# Patient Record
Sex: FEMALE | Race: WHITE | NOT HISPANIC OR LATINO | Employment: OTHER | URBAN - METROPOLITAN AREA
[De-identification: names, ages, dates, MRNs, and addresses within clinical notes are randomized per-mention and may not be internally consistent; named-entity substitution may affect disease eponyms.]

---

## 2023-09-28 LAB
ALANINE AMINOTRANSFERASE (SGPT) (U/L) IN SER/PLAS: 19 U/L (ref 7–45)
ALBUMIN (G/DL) IN SER/PLAS: 3.4 G/DL (ref 3.4–5)
ALKALINE PHOSPHATASE (U/L) IN SER/PLAS: 66 U/L (ref 33–136)
ANION GAP IN SER/PLAS: 11 MMOL/L (ref 10–20)
ASPARTATE AMINOTRANSFERASE (SGOT) (U/L) IN SER/PLAS: 22 U/L (ref 9–39)
BASOPHILS (10*3/UL) IN BLOOD BY AUTOMATED COUNT: 0.02 X10E9/L (ref 0–0.1)
BASOPHILS/100 LEUKOCYTES IN BLOOD BY AUTOMATED COUNT: 0.3 % (ref 0–2)
BILIRUBIN TOTAL (MG/DL) IN SER/PLAS: 0.6 MG/DL (ref 0–1.2)
CALCIUM (MG/DL) IN SER/PLAS: 8.2 MG/DL (ref 8.6–10.3)
CARBON DIOXIDE, TOTAL (MMOL/L) IN SER/PLAS: 31 MMOL/L (ref 21–32)
CHLORIDE (MMOL/L) IN SER/PLAS: 96 MMOL/L (ref 98–107)
CREATININE (MG/DL) IN SER/PLAS: 0.85 MG/DL (ref 0.5–1.05)
ERYTHROCYTE DISTRIBUTION WIDTH (RATIO) BY AUTOMATED COUNT: 12.6 % (ref 11.5–14.5)
ERYTHROCYTE MEAN CORPUSCULAR HEMOGLOBIN CONCENTRATION (G/DL) BY AUTOMATED: 32.9 G/DL (ref 32–36)
ERYTHROCYTE MEAN CORPUSCULAR VOLUME (FL) BY AUTOMATED COUNT: 102 FL (ref 80–100)
ERYTHROCYTES (10*6/UL) IN BLOOD BY AUTOMATED COUNT: 4.06 X10E12/L (ref 4–5.2)
FLU A RESULT: NORMAL
FLU A RESULT: NOT DETECTED
FLU B RESULT: NORMAL
FLU B RESULT: NOT DETECTED
GFR FEMALE: 67 ML/MIN/1.73M2
GLUCOSE (MG/DL) IN SER/PLAS: 117 MG/DL (ref 74–99)
HEMATOCRIT (%) IN BLOOD BY AUTOMATED COUNT: 41.4 % (ref 36–46)
HEMOGLOBIN (G/DL) IN BLOOD: 13.6 G/DL (ref 12–16)
IMMATURE GRANULOCYTES/100 LEUKOCYTES IN BLOOD BY AUTOMATED COUNT: 0.3 % (ref 0–0.9)
INR IN PPP BY COAGULATION ASSAY: 3.9 (ref 0.9–1.1)
LACTATE (MMOL/L) IN SER/PLAS: 0.7 MMOL/L (ref 0.4–2)
LEUKOCYTES (10*3/UL) IN BLOOD BY AUTOMATED COUNT: 7.7 X10E9/L (ref 4.4–11.3)
LYMPHOCYTES (10*3/UL) IN BLOOD BY AUTOMATED COUNT: 0.7 X10E9/L (ref 0.8–3)
LYMPHOCYTES/100 LEUKOCYTES IN BLOOD BY AUTOMATED COUNT: 9.2 % (ref 13–44)
MONOCYTES (10*3/UL) IN BLOOD BY AUTOMATED COUNT: 0.85 X10E9/L (ref 0.05–0.8)
MONOCYTES/100 LEUKOCYTES IN BLOOD BY AUTOMATED COUNT: 11.1 % (ref 2–10)
NATRIURETIC PEPTIDE B (PG/ML) IN SER/PLAS: 403 PG/ML (ref 0–99)
NEUTROPHILS (10*3/UL) IN BLOOD BY AUTOMATED COUNT: 6.06 X10E9/L (ref 1.6–5.5)
NEUTROPHILS/100 LEUKOCYTES IN BLOOD BY AUTOMATED COUNT: 79.1 % (ref 40–80)
PATIENT TEMPERATURE: 37 DEGREES C
PLATELETS (10*3/UL) IN BLOOD AUTOMATED COUNT: 240 X10E9/L (ref 150–450)
POCT ANION GAP, VENOUS: 2 MMOL/L (ref 10–25)
POCT BASE EXCESS, VENOUS: 7.4 MMOL/L (ref -2–3)
POCT CHLORIDE, VENOUS: 95 MMOL/L (ref 98–107)
POCT GLUCOSE, VENOUS: 123 MG/DL (ref 74–99)
POCT HCO3, VENOUS: 33.3 MMOL/L (ref 22–26)
POCT HEMATOCRIT, VENOUS: 54 % (ref 36–46)
POCT HEMOGLOBIN, VENOUS: 17.9 G/DL (ref 12–16)
POCT IONIZED CALCIUM, VENOUS: 1.07 MMOL/L (ref 1.1–1.33)
POCT LACTATE, VENOUS: 1 MMOL/L (ref 0.4–2)
POCT OXY HEMOGLOBIN, VENOUS: 39.8 % (ref 45–75)
POCT PCO2, VENOUS: 49 MMHG (ref 41–51)
POCT PH, VENOUS: 7.44 (ref 7.33–7.43)
POCT PO2, VENOUS: 23 MMHG (ref 35–45)
POCT POTASSIUM, VENOUS: 3.5 MMOL/L (ref 3.5–5.3)
POCT SO2, VENOUS: 40 % (ref 45–75)
POCT SODIUM, VENOUS: 127 MMOL/L (ref 136–145)
POTASSIUM (MMOL/L) IN SER/PLAS: 3.6 MMOL/L (ref 3.5–5.3)
PROTEIN TOTAL: 6.2 G/DL (ref 6.4–8.2)
PROTHROMBIN TIME (PT) IN PPP BY COAGULATION ASSAY: 44.8 SEC (ref 9.8–12.8)
SARS-COV-2 RESULT: DETECTED
SODIUM (MMOL/L) IN SER/PLAS: 134 MMOL/L (ref 136–145)
TROPONIN I, HIGH SENSITIVITY: 14 NG/L (ref 0–13)
TROPONIN I, HIGH SENSITIVITY: 15 NG/L (ref 0–13)
UREA NITROGEN (MG/DL) IN SER/PLAS: 15 MG/DL (ref 6–23)

## 2023-09-28 PROCEDURE — 71046 X-RAY EXAM CHEST 2 VIEWS: CPT

## 2023-09-28 PROCEDURE — 70450 CT HEAD/BRAIN W/O DYE: CPT

## 2023-09-28 PROCEDURE — 83605 ASSAY OF LACTIC ACID: CPT | Mod: 91

## 2023-09-28 PROCEDURE — 96367 TX/PROPH/DG ADDL SEQ IV INF: CPT

## 2023-09-28 PROCEDURE — 9990 CHARGE CONVERSION: Mod: 91

## 2023-09-28 PROCEDURE — 99291 CRITICAL CARE FIRST HOUR: CPT

## 2023-09-28 PROCEDURE — 82947 ASSAY GLUCOSE BLOOD QUANT: CPT | Mod: 91

## 2023-09-28 PROCEDURE — 87040 BLOOD CULTURE FOR BACTERIA: CPT | Mod: 59

## 2023-09-28 PROCEDURE — 83880 ASSAY OF NATRIURETIC PEPTIDE: CPT

## 2023-09-28 PROCEDURE — 94761 N-INVAS EAR/PLS OXIMETRY MLT: CPT

## 2023-09-28 PROCEDURE — 96365 THER/PROPH/DIAG IV INF INIT: CPT

## 2023-09-28 PROCEDURE — 84132 ASSAY OF SERUM POTASSIUM: CPT | Mod: 91

## 2023-09-28 PROCEDURE — 71045 X-RAY EXAM CHEST 1 VIEW: CPT

## 2023-09-28 PROCEDURE — 85379 FIBRIN DEGRADATION QUANT: CPT

## 2023-09-28 PROCEDURE — 85610 PROTHROMBIN TIME: CPT

## 2023-09-28 PROCEDURE — 82330 ASSAY OF CALCIUM: CPT

## 2023-09-28 PROCEDURE — 82805 BLOOD GASES W/O2 SATURATION: CPT

## 2023-09-28 PROCEDURE — 80053 COMPREHEN METABOLIC PANEL: CPT

## 2023-09-28 PROCEDURE — 85025 COMPLETE CBC W/AUTO DIFF WBC: CPT

## 2023-09-28 PROCEDURE — 93005 ELECTROCARDIOGRAM TRACING: CPT

## 2023-09-28 PROCEDURE — 85018 HEMOGLOBIN: CPT | Mod: 91

## 2023-09-28 PROCEDURE — 84484 ASSAY OF TROPONIN QUANT: CPT

## 2023-09-28 PROCEDURE — 87636 SARSCOV2 & INF A&B AMP PRB: CPT

## 2023-09-28 PROCEDURE — 82435 ASSAY OF BLOOD CHLORIDE: CPT | Mod: 91

## 2023-09-28 PROCEDURE — 84295 ASSAY OF SERUM SODIUM: CPT | Mod: 91

## 2023-09-29 ENCOUNTER — HOSPITAL ENCOUNTER (INPATIENT)
Dept: DATA CONVERSION | Facility: HOSPITAL | Age: 83
LOS: 1 days | Discharge: HOME | DRG: 871 | End: 2023-09-30
Attending: STUDENT IN AN ORGANIZED HEALTH CARE EDUCATION/TRAINING PROGRAM | Admitting: INTERNAL MEDICINE
Payer: MEDICARE

## 2023-09-29 DIAGNOSIS — I48.91 ATRIAL FIBRILLATION, UNSPECIFIED TYPE (MULTI): Primary | ICD-10-CM

## 2023-09-29 DIAGNOSIS — U07.1 COVID-19: ICD-10-CM

## 2023-09-29 LAB
ALBUMIN (G/DL) IN SER/PLAS: 3 G/DL (ref 3.4–5)
ANION GAP IN SER/PLAS: 11 MMOL/L (ref 10–20)
APPEARANCE, URINE: CLEAR
BASOPHILS (10*3/UL) IN BLOOD BY AUTOMATED COUNT: 0.03 X10E9/L (ref 0–0.1)
BASOPHILS/100 LEUKOCYTES IN BLOOD BY AUTOMATED COUNT: 0.5 % (ref 0–2)
BILIRUBIN, URINE: NEGATIVE
BLOOD, URINE: NEGATIVE
CALCIUM (MG/DL) IN SER/PLAS: 7.7 MG/DL (ref 8.6–10.3)
CARBON DIOXIDE, TOTAL (MMOL/L) IN SER/PLAS: 26 MMOL/L (ref 21–32)
CHLORIDE (MMOL/L) IN SER/PLAS: 101 MMOL/L (ref 98–107)
COLOR, URINE: YELLOW
CREATININE (MG/DL) IN SER/PLAS: 0.71 MG/DL (ref 0.5–1.05)
EOSINOPHILS (10*3/UL) IN BLOOD BY AUTOMATED COUNT: 0.02 X10E9/L (ref 0–0.4)
EOSINOPHILS/100 LEUKOCYTES IN BLOOD BY AUTOMATED COUNT: 0.3 % (ref 0–6)
ERYTHROCYTE DISTRIBUTION WIDTH (RATIO) BY AUTOMATED COUNT: 12.5 % (ref 11.5–14.5)
ERYTHROCYTE MEAN CORPUSCULAR HEMOGLOBIN CONCENTRATION (G/DL) BY AUTOMATED: 31.5 G/DL (ref 32–36)
ERYTHROCYTE MEAN CORPUSCULAR VOLUME (FL) BY AUTOMATED COUNT: 103 FL (ref 80–100)
ERYTHROCYTES (10*6/UL) IN BLOOD BY AUTOMATED COUNT: 3.95 X10E12/L (ref 4–5.2)
FIBRIN D-DIMER (NG/ML FEU) IN PLATELET POOR PLASMA: 408 NG/ML FEU
GFR FEMALE: 83 ML/MIN/1.73M2
GLUCOSE (MG/DL) IN SER/PLAS: 84 MG/DL (ref 74–99)
GLUCOSE, URINE: NEGATIVE MG/DL
HEMATOCRIT (%) IN BLOOD BY AUTOMATED COUNT: 40.6 % (ref 36–46)
HEMOGLOBIN (G/DL) IN BLOOD: 12.8 G/DL (ref 12–16)
IMMATURE GRANULOCYTES/100 LEUKOCYTES IN BLOOD BY AUTOMATED COUNT: 0.5 % (ref 0–0.9)
INR IN PPP BY COAGULATION ASSAY: 3.4 (ref 0.9–1.1)
KETONES, URINE: NEGATIVE MG/DL
LEUKOCYTE ESTERASE, URINE: NEGATIVE
LEUKOCYTES (10*3/UL) IN BLOOD BY AUTOMATED COUNT: 6.4 X10E9/L (ref 4.4–11.3)
LYMPHOCYTES (10*3/UL) IN BLOOD BY AUTOMATED COUNT: 1.05 X10E9/L (ref 0.8–3)
LYMPHOCYTES/100 LEUKOCYTES IN BLOOD BY AUTOMATED COUNT: 16.4 % (ref 13–44)
MAGNESIUM (MG/DL) IN SER/PLAS: 1.83 MG/DL (ref 1.6–2.4)
MONOCYTES (10*3/UL) IN BLOOD BY AUTOMATED COUNT: 0.97 X10E9/L (ref 0.05–0.8)
MONOCYTES/100 LEUKOCYTES IN BLOOD BY AUTOMATED COUNT: 15.2 % (ref 2–10)
NEUTROPHILS (10*3/UL) IN BLOOD BY AUTOMATED COUNT: 4.3 X10E9/L (ref 1.6–5.5)
NEUTROPHILS/100 LEUKOCYTES IN BLOOD BY AUTOMATED COUNT: 67.1 % (ref 40–80)
NITRITE, URINE: NEGATIVE
PH, URINE: 6 (ref 5–8)
PHOSPHATE (MG/DL) IN SER/PLAS: 2.4 MG/DL (ref 2.5–4.9)
PLATELETS (10*3/UL) IN BLOOD AUTOMATED COUNT: 206 X10E9/L (ref 150–450)
POTASSIUM (MMOL/L) IN SER/PLAS: 3.4 MMOL/L (ref 3.5–5.3)
PROTEIN, URINE: NEGATIVE MG/DL
PROTHROMBIN TIME (PT) IN PPP BY COAGULATION ASSAY: 38.3 SEC (ref 9.8–12.8)
SODIUM (MMOL/L) IN SER/PLAS: 135 MMOL/L (ref 136–145)
SPECIFIC GRAVITY, URINE: 1.01 (ref 1–1.03)
TROPONIN I, HIGH SENSITIVITY: NORMAL
UREA NITROGEN (MG/DL) IN SER/PLAS: 12 MG/DL (ref 6–23)
UROBILINOGEN, URINE: <2 MG/DL (ref 0–1.9)

## 2023-09-29 PROCEDURE — 85025 COMPLETE CBC W/AUTO DIFF WBC: CPT

## 2023-09-29 PROCEDURE — 70450 CT HEAD/BRAIN W/O DYE: CPT

## 2023-09-29 PROCEDURE — 96366 THER/PROPH/DIAG IV INF ADDON: CPT

## 2023-09-29 PROCEDURE — 71045 X-RAY EXAM CHEST 1 VIEW: CPT

## 2023-09-29 PROCEDURE — 3E0D73Z INTRODUCTION OF ANTI-INFLAMMATORY INTO MOUTH AND PHARYNX, VIA NATURAL OR ARTIFICIAL OPENING: ICD-10-PCS | Performed by: INTERNAL MEDICINE

## 2023-09-29 PROCEDURE — 81003 URINALYSIS AUTO W/O SCOPE: CPT

## 2023-09-29 PROCEDURE — 96367 TX/PROPH/DG ADDL SEQ IV INF: CPT

## 2023-09-29 PROCEDURE — 71046 X-RAY EXAM CHEST 2 VIEWS: CPT

## 2023-09-29 PROCEDURE — 97165 OT EVAL LOW COMPLEX 30 MIN: CPT | Mod: GO

## 2023-09-29 PROCEDURE — 99291 CRITICAL CARE FIRST HOUR: CPT

## 2023-09-29 PROCEDURE — 96365 THER/PROPH/DIAG IV INF INIT: CPT

## 2023-09-29 PROCEDURE — 80069 RENAL FUNCTION PANEL: CPT

## 2023-09-29 PROCEDURE — 93005 ELECTROCARDIOGRAM TRACING: CPT

## 2023-09-29 PROCEDURE — 85610 PROTHROMBIN TIME: CPT

## 2023-09-29 PROCEDURE — 36415 COLL VENOUS BLD VENIPUNCTURE: CPT

## 2023-09-29 PROCEDURE — 97161 PT EVAL LOW COMPLEX 20 MIN: CPT | Mod: GP

## 2023-09-29 PROCEDURE — 9990 CHARGE CONVERSION: Mod: GO

## 2023-09-29 PROCEDURE — 83735 ASSAY OF MAGNESIUM: CPT

## 2023-09-29 PROCEDURE — 94761 N-INVAS EAR/PLS OXIMETRY MLT: CPT

## 2023-09-29 PROCEDURE — XW033E5 INTRODUCTION OF REMDESIVIR ANTI-INFECTIVE INTO PERIPHERAL VEIN, PERCUTANEOUS APPROACH, NEW TECHNOLOGY GROUP 5: ICD-10-PCS | Performed by: INTERNAL MEDICINE

## 2023-09-29 RX ORDER — SPIRONOLACTONE 25 MG/1
25 TABLET ORAL DAILY
Status: DISCONTINUED | OUTPATIENT
Start: 2023-09-30 | End: 2023-09-30

## 2023-09-29 RX ORDER — WARFARIN SODIUM 5 MG/1
5 TABLET ORAL SEE ADMIN INSTRUCTIONS
Status: ON HOLD | COMMUNITY
End: 2023-09-30 | Stop reason: SDUPTHER

## 2023-09-29 RX ORDER — TORSEMIDE 20 MG/1
20 TABLET ORAL 2 TIMES DAILY
COMMUNITY

## 2023-09-29 RX ORDER — DEXAMETHASONE 6 MG/1
6 TABLET ORAL DAILY
Status: DISCONTINUED | OUTPATIENT
Start: 2023-09-30 | End: 2023-09-29

## 2023-09-29 RX ORDER — ONDANSETRON HYDROCHLORIDE 2 MG/ML
4 INJECTION, SOLUTION INTRAVENOUS EVERY 4 HOURS PRN
Status: DISCONTINUED | OUTPATIENT
Start: 2023-09-30 | End: 2023-09-30 | Stop reason: HOSPADM

## 2023-09-29 RX ORDER — DIGOXIN 125 MCG
125 TABLET ORAL DAILY
Status: DISCONTINUED | OUTPATIENT
Start: 2023-09-30 | End: 2023-09-30 | Stop reason: HOSPADM

## 2023-09-29 RX ORDER — MIDODRINE HYDROCHLORIDE 2.5 MG/1
2.5 TABLET ORAL EVERY MORNING
COMMUNITY

## 2023-09-29 RX ORDER — SPIRONOLACTONE 25 MG/1
25 TABLET ORAL DAILY
Status: DISCONTINUED | OUTPATIENT
Start: 2023-09-30 | End: 2023-09-30 | Stop reason: HOSPADM

## 2023-09-29 RX ORDER — METOPROLOL TARTRATE 25 MG/1
25 TABLET, FILM COATED ORAL 3 TIMES DAILY
Status: DISCONTINUED | OUTPATIENT
Start: 2023-09-30 | End: 2023-09-30 | Stop reason: HOSPADM

## 2023-09-29 RX ORDER — BUDESONIDE 180 UG/1
1 AEROSOL, POWDER RESPIRATORY (INHALATION)
COMMUNITY

## 2023-09-29 RX ORDER — ESTRADIOL 0.1 MG/G
2 CREAM VAGINAL 3 TIMES WEEKLY
COMMUNITY

## 2023-09-29 RX ORDER — DIGOXIN 125 MCG
125 TABLET ORAL DAILY
COMMUNITY

## 2023-09-29 RX ORDER — ACETAMINOPHEN 325 MG/1
650 TABLET ORAL EVERY 4 HOURS PRN
Status: DISCONTINUED | OUTPATIENT
Start: 2023-09-30 | End: 2023-09-30

## 2023-09-29 RX ORDER — TORSEMIDE 20 MG/1
20 TABLET ORAL 2 TIMES DAILY
Status: DISCONTINUED | OUTPATIENT
Start: 2023-09-30 | End: 2023-09-30 | Stop reason: HOSPADM

## 2023-09-29 RX ORDER — LEVOTHYROXINE SODIUM 75 UG/1
75 TABLET ORAL
COMMUNITY

## 2023-09-29 RX ORDER — WARFARIN SODIUM 5 MG/1
7.5 TABLET ORAL
COMMUNITY
End: 2023-09-30 | Stop reason: HOSPADM

## 2023-09-29 RX ORDER — SPIRONOLACTONE 25 MG/1
25 TABLET ORAL DAILY
COMMUNITY

## 2023-09-29 RX ORDER — METOPROLOL TARTRATE 25 MG/1
25 TABLET, FILM COATED ORAL 3 TIMES DAILY
COMMUNITY

## 2023-09-29 RX ORDER — LEVOTHYROXINE SODIUM 75 UG/1
75 TABLET ORAL
Status: DISCONTINUED | OUTPATIENT
Start: 2023-09-30 | End: 2023-09-30 | Stop reason: HOSPADM

## 2023-09-29 RX ORDER — TORSEMIDE 20 MG/1
20 TABLET ORAL 2 TIMES DAILY
Status: DISCONTINUED | OUTPATIENT
Start: 2023-09-30 | End: 2023-09-29

## 2023-09-29 RX ORDER — PANTOPRAZOLE SODIUM 40 MG/1
40 TABLET, DELAYED RELEASE ORAL DAILY
Status: DISCONTINUED | OUTPATIENT
Start: 2023-09-30 | End: 2023-09-30 | Stop reason: HOSPADM

## 2023-09-29 RX ORDER — MIDODRINE HYDROCHLORIDE 5 MG/1
2.5 TABLET ORAL 2 TIMES DAILY
Status: DISCONTINUED | OUTPATIENT
Start: 2023-09-30 | End: 2023-09-30 | Stop reason: HOSPADM

## 2023-09-30 VITALS
RESPIRATION RATE: 19 BRPM | DIASTOLIC BLOOD PRESSURE: 72 MMHG | OXYGEN SATURATION: 99 % | BODY MASS INDEX: 23.73 KG/M2 | TEMPERATURE: 97.7 F | WEIGHT: 139 LBS | HEART RATE: 83 BPM | SYSTOLIC BLOOD PRESSURE: 110 MMHG | HEIGHT: 64 IN

## 2023-09-30 PROBLEM — I95.9 HYPOTENSION, UNSPECIFIED: Status: ACTIVE | Noted: 2022-01-01

## 2023-09-30 PROBLEM — H25.9 AGE-RELATED CATARACT: Status: ACTIVE | Noted: 2022-01-01

## 2023-09-30 PROBLEM — Z96.652 PRESENCE OF LEFT ARTIFICIAL KNEE JOINT: Status: ACTIVE | Noted: 2022-01-01

## 2023-09-30 PROBLEM — I83.93 VARICOSE VEINS OF BOTH LOWER EXTREMITIES: Status: ACTIVE | Noted: 2022-01-01

## 2023-09-30 PROBLEM — Z47.1 AFTERCARE FOLLOWING JOINT REPLACEMENT SURGERY: Status: ACTIVE | Noted: 2022-01-01

## 2023-09-30 PROBLEM — M81.0 AGE-RELATED OSTEOPOROSIS WITHOUT CURRENT PATHOLOGICAL FRACTURE: Status: ACTIVE | Noted: 2022-01-01

## 2023-09-30 PROBLEM — M18.12 UNIL PRIMARY OSTEOARTH OF FIRST CARPOMETACARP JOINT, L HAND: Status: ACTIVE | Noted: 2022-01-01

## 2023-09-30 PROBLEM — U07.1 COVID-19: Status: ACTIVE | Noted: 2023-09-30

## 2023-09-30 PROBLEM — M47.818: Status: ACTIVE | Noted: 2022-01-01

## 2023-09-30 LAB
ALBUMIN SERPL BCP-MCNC: 3 G/DL (ref 3.4–5)
ANION GAP SERPL CALC-SCNC: 10 MMOL/L (ref 10–20)
BUN SERPL-MCNC: 16 MG/DL (ref 6–23)
CALCIUM SERPL-MCNC: 7.7 MG/DL (ref 8.6–10.3)
CHLORIDE SERPL-SCNC: 102 MMOL/L (ref 98–107)
CO2 SERPL-SCNC: 30 MMOL/L (ref 21–32)
CREAT SERPL-MCNC: 0.75 MG/DL (ref 0.5–1.05)
CRP SERPL HS-MCNC: 69.6 MG/L
ERYTHROCYTE [DISTWIDTH] IN BLOOD BY AUTOMATED COUNT: 12.5 % (ref 11.5–14.5)
GFR SERPL CREATININE-BSD FRML MDRD: 79 ML/MIN/1.73M*2
GLUCOSE SERPL-MCNC: 114 MG/DL (ref 74–99)
HCT VFR BLD AUTO: 39.6 % (ref 36–46)
HGB BLD-MCNC: 13.1 G/DL (ref 12–16)
INR PPP: 2.9 (ref 0.9–1.1)
MAGNESIUM SERPL-MCNC: 1.89 MG/DL (ref 1.6–2.4)
MCHC RBC AUTO-ENTMCNC: 33.1 G/DL (ref 32–36)
MCV RBC AUTO: 100 FL (ref 80–100)
PHOSPHATE SERPL-MCNC: 3.2 MG/DL (ref 2.5–4.9)
PLATELET # BLD AUTO: 219 X10*3/UL (ref 150–450)
POTASSIUM SERPL-SCNC: 3.7 MMOL/L (ref 3.5–5.3)
PROTHROMBIN TIME: 32.9 SECONDS (ref 9.8–12.8)
RBC # BLD AUTO: 3.98 X10*6/UL (ref 4–5.2)
SODIUM SERPL-SCNC: 138 MMOL/L (ref 136–145)
WBC # BLD AUTO: 5.4 X10*3/UL (ref 4.4–11.3)

## 2023-09-30 PROCEDURE — 99238 HOSP IP/OBS DSCHRG MGMT 30/<: CPT

## 2023-09-30 PROCEDURE — 87040 BLOOD CULTURE FOR BACTERIA: CPT | Mod: 59

## 2023-09-30 PROCEDURE — 83605 ASSAY OF LACTIC ACID: CPT | Mod: 91

## 2023-09-30 PROCEDURE — 85027 COMPLETE CBC AUTOMATED: CPT | Performed by: INTERNAL MEDICINE

## 2023-09-30 PROCEDURE — 94640 AIRWAY INHALATION TREATMENT: CPT

## 2023-09-30 PROCEDURE — 83880 ASSAY OF NATRIURETIC PEPTIDE: CPT

## 2023-09-30 PROCEDURE — 83735 ASSAY OF MAGNESIUM: CPT

## 2023-09-30 PROCEDURE — 83735 ASSAY OF MAGNESIUM: CPT | Performed by: INTERNAL MEDICINE

## 2023-09-30 PROCEDURE — 82947 ASSAY GLUCOSE BLOOD QUANT: CPT | Mod: 91

## 2023-09-30 PROCEDURE — 85025 COMPLETE CBC W/AUTO DIFF WBC: CPT

## 2023-09-30 PROCEDURE — 2500000001 HC RX 250 WO HCPCS SELF ADMINISTERED DRUGS (ALT 637 FOR MEDICARE OP): Performed by: INTERNAL MEDICINE

## 2023-09-30 PROCEDURE — 2500000004 HC RX 250 GENERAL PHARMACY W/ HCPCS (ALT 636 FOR OP/ED): Performed by: INTERNAL MEDICINE

## 2023-09-30 PROCEDURE — 82805 BLOOD GASES W/O2 SATURATION: CPT

## 2023-09-30 PROCEDURE — 84132 ASSAY OF SERUM POTASSIUM: CPT | Mod: 91

## 2023-09-30 PROCEDURE — 82330 ASSAY OF CALCIUM: CPT

## 2023-09-30 PROCEDURE — 81003 URINALYSIS AUTO W/O SCOPE: CPT

## 2023-09-30 PROCEDURE — 84295 ASSAY OF SERUM SODIUM: CPT | Mod: 91

## 2023-09-30 PROCEDURE — 9990 CHARGE CONVERSION: Mod: 91

## 2023-09-30 PROCEDURE — 36415 COLL VENOUS BLD VENIPUNCTURE: CPT

## 2023-09-30 PROCEDURE — 80053 COMPREHEN METABOLIC PANEL: CPT

## 2023-09-30 PROCEDURE — 82435 ASSAY OF BLOOD CHLORIDE: CPT | Mod: 91

## 2023-09-30 PROCEDURE — 80069 RENAL FUNCTION PANEL: CPT

## 2023-09-30 PROCEDURE — 85018 HEMOGLOBIN: CPT | Mod: 91

## 2023-09-30 PROCEDURE — 2500000002 HC RX 250 W HCPCS SELF ADMINISTERED DRUGS (ALT 637 FOR MEDICARE OP, ALT 636 FOR OP/ED): Performed by: INTERNAL MEDICINE

## 2023-09-30 PROCEDURE — 80069 RENAL FUNCTION PANEL: CPT | Performed by: INTERNAL MEDICINE

## 2023-09-30 PROCEDURE — 85610 PROTHROMBIN TIME: CPT | Performed by: INTERNAL MEDICINE

## 2023-09-30 PROCEDURE — 36415 COLL VENOUS BLD VENIPUNCTURE: CPT | Performed by: INTERNAL MEDICINE

## 2023-09-30 PROCEDURE — 86141 C-REACTIVE PROTEIN HS: CPT | Mod: CMCLAB,GEALAB | Performed by: INTERNAL MEDICINE

## 2023-09-30 PROCEDURE — 85379 FIBRIN DEGRADATION QUANT: CPT

## 2023-09-30 PROCEDURE — 84484 ASSAY OF TROPONIN QUANT: CPT | Mod: 91

## 2023-09-30 PROCEDURE — 87636 SARSCOV2 & INF A&B AMP PRB: CPT

## 2023-09-30 PROCEDURE — 2500000005 HC RX 250 GENERAL PHARMACY W/O HCPCS: Performed by: INTERNAL MEDICINE

## 2023-09-30 PROCEDURE — 85610 PROTHROMBIN TIME: CPT

## 2023-09-30 RX ORDER — ACETAMINOPHEN 325 MG/1
650 TABLET ORAL EVERY 4 HOURS PRN
Status: DISCONTINUED | OUTPATIENT
Start: 2023-09-30 | End: 2023-09-30 | Stop reason: HOSPADM

## 2023-09-30 RX ORDER — WARFARIN SODIUM 5 MG/1
5 TABLET ORAL DAILY
Status: DISCONTINUED | OUTPATIENT
Start: 2023-09-30 | End: 2023-09-30 | Stop reason: HOSPADM

## 2023-09-30 RX ORDER — WARFARIN SODIUM 5 MG/1
TABLET ORAL
Qty: 1 TABLET | Refills: 0 | Status: SHIPPED | OUTPATIENT
Start: 2023-09-30

## 2023-09-30 RX ADMIN — METOPROLOL TARTRATE 25 MG: 25 TABLET, FILM COATED ORAL at 15:34

## 2023-09-30 RX ADMIN — MIDODRINE HYDROCHLORIDE 2.5 MG: 5 TABLET ORAL at 09:42

## 2023-09-30 RX ADMIN — METOPROLOL TARTRATE 25 MG: 25 TABLET, FILM COATED ORAL at 09:48

## 2023-09-30 RX ADMIN — REMDESIVIR 100 MG: 100 INJECTION, POWDER, LYOPHILIZED, FOR SOLUTION INTRAVENOUS at 06:00

## 2023-09-30 RX ADMIN — POTASSIUM PHOSPHATE, MONOBASIC 500 MG: 500 TABLET, SOLUBLE ORAL at 03:00

## 2023-09-30 RX ADMIN — MOMETASONE FUROATE 1 PUFF: 220 INHALANT RESPIRATORY (INHALATION) at 09:49

## 2023-09-30 RX ADMIN — PANTOPRAZOLE SODIUM 40 MG: 40 TABLET, DELAYED RELEASE ORAL at 09:42

## 2023-09-30 RX ADMIN — TORSEMIDE 20 MG: 20 TABLET ORAL at 09:46

## 2023-09-30 RX ADMIN — DIGOXIN 125 MCG: 125 TABLET ORAL at 09:41

## 2023-09-30 RX ADMIN — LEVOTHYROXINE SODIUM 75 MCG: 75 TABLET ORAL at 09:50

## 2023-09-30 RX ADMIN — SPIRONOLACTONE 25 MG: 25 TABLET ORAL at 09:45

## 2023-09-30 SDOH — HEALTH STABILITY: PHYSICAL HEALTH: ON AVERAGE, HOW MANY MINUTES DO YOU ENGAGE IN EXERCISE AT THIS LEVEL?: 0 MIN

## 2023-09-30 SDOH — ECONOMIC STABILITY: HOUSING INSECURITY
IN THE LAST 12 MONTHS, WAS THERE A TIME WHEN YOU DID NOT HAVE A STEADY PLACE TO SLEEP OR SLEPT IN A SHELTER (INCLUDING NOW)?: NO

## 2023-09-30 SDOH — ECONOMIC STABILITY: INCOME INSECURITY: IN THE LAST 12 MONTHS, WAS THERE A TIME WHEN YOU WERE NOT ABLE TO PAY THE MORTGAGE OR RENT ON TIME?: NO

## 2023-09-30 SDOH — HEALTH STABILITY: MENTAL HEALTH: HOW MANY STANDARD DRINKS CONTAINING ALCOHOL DO YOU HAVE ON A TYPICAL DAY?: PATIENT DOES NOT DRINK

## 2023-09-30 SDOH — SOCIAL STABILITY: SOCIAL INSECURITY: HAS ANYONE EVER THREATENED TO HURT YOUR FAMILY OR YOUR PETS?: NO

## 2023-09-30 SDOH — SOCIAL STABILITY: SOCIAL INSECURITY: DO YOU FEEL UNSAFE GOING BACK TO THE PLACE WHERE YOU ARE LIVING?: NO

## 2023-09-30 SDOH — ECONOMIC STABILITY: HOUSING INSECURITY: IN THE LAST 12 MONTHS, HOW MANY PLACES HAVE YOU LIVED?: 1

## 2023-09-30 SDOH — HEALTH STABILITY: MENTAL HEALTH: HOW OFTEN DO YOU HAVE A DRINK CONTAINING ALCOHOL?: NEVER

## 2023-09-30 SDOH — ECONOMIC STABILITY: INCOME INSECURITY: IN THE PAST 12 MONTHS, HAS THE ELECTRIC, GAS, OIL, OR WATER COMPANY THREATENED TO SHUT OFF SERVICE IN YOUR HOME?: NO

## 2023-09-30 SDOH — ECONOMIC STABILITY: INCOME INSECURITY: HOW HARD IS IT FOR YOU TO PAY FOR THE VERY BASICS LIKE FOOD, HOUSING, MEDICAL CARE, AND HEATING?: NOT VERY HARD

## 2023-09-30 SDOH — SOCIAL STABILITY: SOCIAL INSECURITY: DO YOU FEEL ANYONE HAS EXPLOITED OR TAKEN ADVANTAGE OF YOU FINANCIALLY OR OF YOUR PERSONAL PROPERTY?: NO

## 2023-09-30 SDOH — ECONOMIC STABILITY: FOOD INSECURITY: WITHIN THE PAST 12 MONTHS, YOU WORRIED THAT YOUR FOOD WOULD RUN OUT BEFORE YOU GOT MONEY TO BUY MORE.: NEVER TRUE

## 2023-09-30 SDOH — SOCIAL STABILITY: SOCIAL INSECURITY: ARE YOU OR HAVE YOU BEEN THREATENED OR ABUSED PHYSICALLY, EMOTIONALLY, OR SEXUALLY BY ANYONE?: NO

## 2023-09-30 SDOH — SOCIAL STABILITY: SOCIAL INSECURITY: DOES ANYONE TRY TO KEEP YOU FROM HAVING/CONTACTING OTHER FRIENDS OR DOING THINGS OUTSIDE YOUR HOME?: NO

## 2023-09-30 SDOH — HEALTH STABILITY: PHYSICAL HEALTH: ON AVERAGE, HOW MANY DAYS PER WEEK DO YOU ENGAGE IN MODERATE TO STRENUOUS EXERCISE (LIKE A BRISK WALK)?: 0 DAYS

## 2023-09-30 SDOH — ECONOMIC STABILITY: TRANSPORTATION INSECURITY
IN THE PAST 12 MONTHS, HAS THE LACK OF TRANSPORTATION KEPT YOU FROM MEDICAL APPOINTMENTS OR FROM GETTING MEDICATIONS?: NO

## 2023-09-30 SDOH — HEALTH STABILITY: MENTAL HEALTH: HOW OFTEN DO YOU HAVE 6 OR MORE DRINKS ON ONE OCCASION?: NEVER

## 2023-09-30 SDOH — SOCIAL STABILITY: SOCIAL INSECURITY: WERE YOU ABLE TO COMPLETE ALL THE BEHAVIORAL HEALTH SCREENINGS?: YES

## 2023-09-30 SDOH — SOCIAL STABILITY: SOCIAL INSECURITY: HAVE YOU HAD THOUGHTS OF HARMING ANYONE ELSE?: NO

## 2023-09-30 SDOH — SOCIAL STABILITY: SOCIAL INSECURITY: ARE THERE ANY APPARENT SIGNS OF INJURIES/BEHAVIORS THAT COULD BE RELATED TO ABUSE/NEGLECT?: NO

## 2023-09-30 SDOH — ECONOMIC STABILITY: FOOD INSECURITY: WITHIN THE PAST 12 MONTHS, THE FOOD YOU BOUGHT JUST DIDN'T LAST AND YOU DIDN'T HAVE MONEY TO GET MORE.: NEVER TRUE

## 2023-09-30 SDOH — ECONOMIC STABILITY: TRANSPORTATION INSECURITY
IN THE PAST 12 MONTHS, HAS LACK OF TRANSPORTATION KEPT YOU FROM MEETINGS, WORK, OR FROM GETTING THINGS NEEDED FOR DAILY LIVING?: NO

## 2023-09-30 SDOH — SOCIAL STABILITY: SOCIAL INSECURITY: ABUSE: ADULT

## 2023-09-30 ASSESSMENT — LIFESTYLE VARIABLES
HOW OFTEN DO YOU HAVE 6 OR MORE DRINKS ON ONE OCCASION: NEVER
SUBSTANCE_ABUSE_PAST_12_MONTHS: NO
SUBSTANCE_ABUSE_PAST_12_MONTHS: NO
AUDIT-C TOTAL SCORE: 0
HOW OFTEN DO YOU HAVE 6 OR MORE DRINKS ON ONE OCCASION: NEVER
SKIP TO QUESTIONS 9-10: 1
AUDIT-C TOTAL SCORE: 0
PRESCIPTION_ABUSE_PAST_12_MONTHS: NO
HOW OFTEN DO YOU HAVE A DRINK CONTAINING ALCOHOL: NEVER
HOW OFTEN DO YOU HAVE A DRINK CONTAINING ALCOHOL: NEVER
SKIP TO QUESTIONS 9-10: 1
AUDIT-C TOTAL SCORE: 0
AUDIT-C TOTAL SCORE: 0
PRESCIPTION_ABUSE_PAST_12_MONTHS: NO
AUDIT-C TOTAL SCORE: 0
SKIP TO QUESTIONS 9-10: 1
HOW MANY STANDARD DRINKS CONTAINING ALCOHOL DO YOU HAVE ON A TYPICAL DAY: PATIENT DOES NOT DRINK
AUDIT-C TOTAL SCORE: 0
HOW MANY STANDARD DRINKS CONTAINING ALCOHOL DO YOU HAVE ON A TYPICAL DAY: PATIENT DOES NOT DRINK
SKIP TO QUESTIONS 9-10: 1

## 2023-09-30 ASSESSMENT — ACTIVITIES OF DAILY LIVING (ADL)
BATHING: NEEDS ASSISTANCE
GROOMING: NEEDS ASSISTANCE
PATIENT'S MEMORY ADEQUATE TO SAFELY COMPLETE DAILY ACTIVITIES?: YES
HEARING - LEFT EAR: FUNCTIONAL
ADEQUATE_TO_COMPLETE_ADL: YES
ASSISTIVE_DEVICE: WALKER
JUDGMENT_ADEQUATE_SAFELY_COMPLETE_DAILY_ACTIVITIES: YES
DRESSING YOURSELF: NEEDS ASSISTANCE
FEEDING YOURSELF: INDEPENDENT
WALKS IN HOME: INDEPENDENT
TOILETING: NEEDS ASSISTANCE
HEARING - RIGHT EAR: FUNCTIONAL

## 2023-09-30 ASSESSMENT — COGNITIVE AND FUNCTIONAL STATUS - GENERAL
DRESSING REGULAR LOWER BODY CLOTHING: A LOT
MOVING FROM LYING ON BACK TO SITTING ON SIDE OF FLAT BED WITH BEDRAILS: A LITTLE
STANDING UP FROM CHAIR USING ARMS: A LOT
DAILY ACTIVITIY SCORE: 20
PATIENT BASELINE BEDBOUND: NO
TOILETING: A LITTLE
TURNING FROM BACK TO SIDE WHILE IN FLAT BAD: A LITTLE
MOBILITY SCORE: 16
HELP NEEDED FOR BATHING: A LITTLE
CLIMB 3 TO 5 STEPS WITH RAILING: A LOT
MOVING TO AND FROM BED TO CHAIR: A LITTLE
WALKING IN HOSPITAL ROOM: A LITTLE

## 2023-09-30 ASSESSMENT — PATIENT HEALTH QUESTIONNAIRE - PHQ9
2. FEELING DOWN, DEPRESSED OR HOPELESS: NOT AT ALL
2. FEELING DOWN, DEPRESSED OR HOPELESS: NOT AT ALL
SUM OF ALL RESPONSES TO PHQ9 QUESTIONS 1 & 2: 0
1. LITTLE INTEREST OR PLEASURE IN DOING THINGS: NOT AT ALL
SUM OF ALL RESPONSES TO PHQ9 QUESTIONS 1 & 2: 0
1. LITTLE INTEREST OR PLEASURE IN DOING THINGS: NOT AT ALL

## 2023-09-30 ASSESSMENT — COLUMBIA-SUICIDE SEVERITY RATING SCALE - C-SSRS
1. IN THE PAST MONTH, HAVE YOU WISHED YOU WERE DEAD OR WISHED YOU COULD GO TO SLEEP AND NOT WAKE UP?: NO
6. HAVE YOU EVER DONE ANYTHING, STARTED TO DO ANYTHING, OR PREPARED TO DO ANYTHING TO END YOUR LIFE?: NO
2. HAVE YOU ACTUALLY HAD ANY THOUGHTS OF KILLING YOURSELF?: NO

## 2023-09-30 ASSESSMENT — PAIN SCALES - GENERAL: PAINLEVEL_OUTOF10: 0 - NO PAIN

## 2023-09-30 ASSESSMENT — PAIN - FUNCTIONAL ASSESSMENT: PAIN_FUNCTIONAL_ASSESSMENT: 0-10

## 2023-09-30 NOTE — HOSPITAL COURSE
Ms. Sydney Kidd is an 83 year old female with PMHx relevant for persistent A-Fib on coumadin, CHF, sarcoidosis, hypothyroidism, and asthma who presented to [facility] via ambulance with one day history of generalized weakness found to be COVID +. In the ED she was found to be hypotensive 95/66 with a heart rate of 108 so she was treated as sepsis alert with vanc, zosyn, flagyl, and given 2L of fluids. She was then found to be COVID +. CXR and CTH showed no acute abnormalities. Admitted to the floor for further management. Recieved dexamethasone x1, which was then discontinued as patient did not show new oxygen requirement in the setting of COVID infection. Started on remdesivir (9/29 - 9/30). Warfarin held given supratherapeutic INR of 3.9 on admission. Further review of patient's history showed that she has been chronically hypotensive with baseline SBP in the 80s, so home CHF and Afib RVR medications continued. Patient ultimately at baseline without any respiratory symptoms on 9/30. Cleared for discharge by PT/OT with the following recommendations:    Medication changes:  - STOP taking warfarin 7.5mg on Tuesdays. Instead, you can take warfarin 5mg daily.    Issues for follow up:  - Low intensity skilled PT/OT services - please discuss with PCP

## 2023-09-30 NOTE — CARE PLAN
Plan of Care- Transcribed from [facility]    Plan:  Treatment Interventions:  (transfer training; balance training; ADL retraining)  OT Frequency: 2 times per week  OT Discharge Recommendations: Low intensity level of continued care    Problem: Balance  Goal: Balance- Goal transcribed from [facility]  Description: Balance: Established Date 29-Sep-2023  Balance: Goal Details Pt will increase static/dynamic stand to Good to increase safety and indep with functional task completion.  Balance: Time Frame for Goal 2 wks    Outcome: Progressing     Problem: Dressings Lower Extremities  Goal: LB dressing- Goal transcribed from [facility]   Description: Lower Body Dressing: Established Date 29-Sep-2023  Lower Body Dressing: Physical Assist Level Goal supervision  Lower Body Dressing: Time Frame for Goal 2 wks    Outcome: Progressing     Problem: Mobility  Goal: Functional mobility- Goal transcribed from [facility]   Description: Functional Mobility: Established Date 29-Sep-2023  Functional Mobility: Goal Details Pt will demo increased functional mobility to tolerate tasks necessary to complete ADL routine with MOD I.  Functional Mobility: Time Frame for Goal 2 wks    Outcome: Progressing     Problem: Toileting  Goal: Toileting- Goal transcribed from [facility]  Description: Toileting: Established Date 29-Sep-2023  Toileting: Physical Assist Level Goal supervision  Toileting: Time Frame for Goal 2 wks    Outcome: Progressing     Problem: Miscellaneous   Goal: Endurance- Goal transcribed from [facility]  Description: Miscellaneous: Established Date 29-Sep-2023  Miscellaneous: Goal Details Pt will increase endurance to tolerate 15min of activity with no more than 1 rest break in order to increase ability to engage in ADL completion.  Miscellaneous: Time Frame for Goal 2 wks    Outcome: Progressing

## 2023-09-30 NOTE — CARE PLAN
The patient's goals for the shift include  not fall    The clinical goals for the shift include patient will tolerate aseline O2 of 2L for entire shift    Over the shift, the patient did make progress toward the following goals.

## 2023-09-30 NOTE — DISCHARGE SUMMARY
Discharge Diagnosis  COVID-19    Issues Requiring Follow-Up  -Low intensity skilled PT/OT services    Discharge Meds     Your medication list        CHANGE how you take these medications        Instructions Last Dose Given Next Dose Due   warfarin 5 mg tablet  Commonly known as: Coumadin  What changed:   how much to take  how to take this  when to take this  additional instructions  Another medication with the same name was removed. Continue taking this medication, and follow the directions you see here.      Take warfarin 5 mg daily.              CONTINUE taking these medications        Instructions Last Dose Given Next Dose Due   digoxin 125 MCG tablet  Commonly known as: Lanoxin           estradiol 0.01 % (0.1 mg/gram) vaginal cream  Commonly known as: Estrace           levothyroxine 75 mcg tablet  Commonly known as: Synthroid, Levoxyl           metoprolol tartrate 25 mg tablet  Commonly known as: Lopressor           midodrine 2.5 mg tablet  Commonly known as: Proamatine           Pulmicort Flexhaler 180 mcg/actuation inhaler  Generic drug: budesonide           spironolactone 25 mg tablet  Commonly known as: Aldactone           torsemide 20 mg tablet  Commonly known as: Demadex                     Where to Get Your Medications        You can get these medications from any pharmacy    Bring a paper prescription for each of these medications  warfarin 5 mg tablet         Test Results Pending At Discharge  Pending Labs       Order Current Status    C-Reactive Protein, High Sensitivity In process    Blood Culture Preliminary result    Blood Culture Preliminary result            Hospital Course  Ms. Sydney Kidd is an 83 year old female with PMHx relevant for persistent A-Fib on coumadin, CHF, sarcoidosis, hypothyroidism, and asthma who presented to [facility] via ambulance with one day history of generalized weakness found to be COVID +. In the ED she was found to be hypotensive 95/66 with a heart rate of 108 so she  was treated as sepsis alert with vanc, zosyn, flagyl, and given 2L of fluids. She was then found to be COVID +. CXR and CTH showed no acute abnormalities. Admitted to the floor for further management. Recieved dexamethasone x1, which was then discontinued as patient did not show new oxygen requirement in the setting of COVID infection. Started on remdesivir (9/29 - 9/30). Warfarin held given supratherapeutic INR of 3.9 on admission. Further review of patient's history showed that she has been chronically hypotensive with baseline SBP in the 80s, so home CHF and Afib RVR medications continued. Patient ultimately at baseline without any respiratory symptoms on 9/30. Cleared for discharge by PT/OT with the following recommendations:    Medication changes:  - STOP taking warfarin 7.5mg on Tuesdays. Instead, you can take warfarin 5mg daily.    Issues for follow up:  - Low intensity skilled PT/OT services - please discuss with PCP    Pertinent Physical Exam At Time of Discharge  Physical Exam  General: patient appears alert, oriented, in no acute distress  HENT:  NC/AT, oral mucosa moist without lesions  Eyes:  clear sclera, anicteric, EOMI  CV: irregularly irregular rhythm, no JVD, 2+ pulses bilaterally, no lower extremity edema  Resp: clear to auscultation bilaterally, no wheezes, rales or rhonchi, non-labored breathing, symmetric chest wall expansion  Abd:  Soft, non-tender, non-distended. No rebound, rigidity or guarding.   Neuro: gross muscle strength and sensation intact, CN II-XII grossly intact  Extremities: no pitting edema, splint on right wrist from recent wrist fracture  Skin: Warm, dry, intact    Outpatient Follow-Up  Future Appointments   Date Time Provider Department Center   10/9/2023  1:30 PM Fernando Hansen DO [id number] Ahmet Lainez MD

## 2023-09-30 NOTE — PROGRESS NOTES
Sydney Kidd is a 83 y.o. female on day 1 of admission presenting with generalized weakness, found to be COVID+.      Subjective   No acute events overnight. Patient says that she feels slightly stronger than yesterday, however is slightly worried that she would not be able to complete her ADLs right now. Denies fevers, chills, CP, SOB, N/V/D.       Objective     Last Recorded Vitals  BP 90/65   Pulse 99   Temp 36.6 °C (97.9 °F) (Temporal)   Resp 18   Wt 63 kg (139 lb)   SpO2 95%   Intake/Output last 3 Shifts:    Intake/Output Summary (Last 24 hours) at 9/30/2023 0922  Last data filed at 9/30/2023 0600  Gross per 24 hour   Intake 250 ml   Output 850 ml   Net -600 ml       Admission Weight  Weight: 63.3 kg (139 lb 8.8 oz) (09/29/23 1551)    Daily Weight  09/30/23 : 63 kg (139 lb)      Physical Exam  General: patient appears alert, oriented, in no acute distress  HENT:  NC/AT, oral mucosa moist without lesions  Eyes:  clear sclera, anicteric, EOMI  CV: irregularly irregular rhythm, no JVD, 2+ pulses bilaterally, no lower extremity edema  Resp: clear to auscultation bilaterally, no wheezes, rales or rhonchi, non-labored breathing, symmetric chest wall expansion  Abd:  Soft, non-tender, non-distended. No rebound, rigidity or guarding.   Neuro: gross muscle strength and sensation intact, CN II-XII grossly intact  Extremities: no pitting edema, splint on right wrist from recent wrist fracture  Skin: Warm, dry, intact      Medications  Scheduled medications  digoxin, 125 mcg, oral, Daily  influenza, 0.7 mL, intramuscular, Once  levothyroxine, 75 mcg, oral, Daily  metoprolol tartrate, 25 mg, oral, TID  midodrine, 2.5 mg, oral, BID  mometasone, 1 puff, inhalation, BID  pantoprazole, 40 mg, oral, Daily  remdesivir, 100 mg, intravenous, q24h  spironolactone, 25 mg, oral, Daily  torsemide, 20 mg, oral, BID      Continuous medications     PRN medications  PRN medications: acetaminophen, ondansetron     Labs  Results for  "orders placed or performed during the hospital encounter of 09/29/23 (from the past 24 hour(s))   Urinalysis with Reflex Microscopic and Culture   Result Value Ref Range    Color, Urine YELLOW STRAW,YELLOW    Appearance, Urine CLEAR CLEAR    Specific Gravity, Urine 1.008 1.005 - 1.035    pH, Urine 6.0 5.0 - 8.0    Protein, Urine NEGATIVE NEGATIVE mg/dL    Glucose, Urine NEGATIVE NEGATIVE mg/dL    Blood, Urine NEGATIVE NEGATIVE    Ketones, Urine NEGATIVE NEGATIVE mg/dL    Bilirubin, Urine NEGATIVE NEGATIVE    Urobilinogen, Urine <2.0 0.0 - 1.9 mg/dL    Nitrite, Urine NEGATIVE NEGATIVE    Leukocyte Esterase, Urine NEGATIVE NEGATIVE          Assessment/Plan   Ms. Sydney Kidd is an 83 year old female with PMHx relevant for persistent A-Fib on coumadin, CHF, sarcoidosis, hypothyroidism, and asthma who presented to [facility] via ambulance with one day history of generalized weakness found to be COVID +.    Acute Medical issues:    #COVID Infection  #Generalized Weakness, improving  -COVID + in the ED (9/28)  -S/p 2L NS, Cefepime, Flagyl, and Vanc in the ED  -Cont remdesivir (9/29 - current)  -D/c dexamethasone x1 (9/29) given patient is on RA and does not have new O2 need  -CRP daily, D dimer every other day, fibrinogen every other day, LFTs every 3 days  -PT/OT rec low intensity    #Supratherapeutic INR  -INR 3.9 -> 3.4 -> 2.9  - Home regimen: warfarin 5 daily except for 7.5 on Tuesdays  -Trend PT/INR  - Restart warfarin 5mg daily (plan to discontinue the 7.5 once weekly dosing on discharge)    Chronic medical issues:  #Hypotension, chronic  -Per pt, she has chronically had SBPs in the 80s for several years now  -Increase home midodrine 2.5mg daily to BID    #Atrial Fibrillation with RVR  -Follows with Cardiologist Dr. Cabrera at [facility]  -per Cardiology note on 8/2/2021: \"She failed DC cardioversion, dofetilide and many other antiarrhythmics as well as repeated cardioversions. She also failed PVI.\"  -EKG in ED " showed A-fib with RVR  -Continue home medications of metoprolol tartrate 25mg TID and digoxin 125mcg daily  -Cardiac monitoring    #CHF (EF 50-55% in 2020)  -On 2L NC at night  -CXR in ED negative for any acute cardiopulmonary process  -Cont home torsemide 20mg BID and spironolactone 25mg daily    #Hypothyroidism  -Continue home levothyroxine    #Asthma  -Home regimen: Budesonide 180mcg BID  -Mometasone 220mcg BID while inpatient    Fluids: prn  Electrolytes: replete prn   Nutrition: Cardiac diet  GI prophylaxis: Protonix 40mg  VTE prophylaxis: SCD  Supp O2: 2L NC at night    Code Status: FULL    Disposition: Admitted for generalized weakness secondary to COVID infection. On remdesivir, no new O2 needs. Anticipate length of stay <48 hours.            Marium Lainez MD

## 2023-09-30 NOTE — CARE PLAN
The patient's goals for the shift include  sit in the chair for meals    The clinical goals for the shift include patient will tolerate aseline O2 of 2L for entire shift    Over the shift, the patient did not make progress toward the following goals. Barriers to progression include current illness. Recommendations to address these barriers include clinical support and activity when patient is ready.

## 2023-09-30 NOTE — CARE PLAN
The patient's goals for the shift include  to not fall    The clinical goals for the shift include patient will tolerate aseline O2 of 2L for entire shift

## 2023-09-30 NOTE — H&P
"    History of Present Illness:   HPI:    Ms. Sydney Kidd is an 83 year old female with PMHx A-Fib on Coumadin, CHF, sarcoidosis, hypothyroidism, and asthma  who presented to the [facility] ED with one day history of generalized weakness.  She states that she woke up this morning feeling well, but got progressively weaker throughout the day and started feeling chills.  At one point she was so weak she could  not get out of bed or stand up, so her sister called the ambulance for her and she was brought to the ED.  She denies any sore throat, cough, difficulty breathing,  chest pain, nausea, or vomiting.  In the ED she was found to be hypotensive 95/66 with  a heart rate of 108 so she was treated as sepsis alert with vanc, zosyn, flagyl, and given 2L of fluids.  She was then found to be COVID +.  When seen at the bedside on arrival to the floors she states that she still feels fatigued and weak, but it feels  improved.  She continues to deny any additional symptoms.  She also states that her blood pressures chronically run low \"in the 80s\", and  that her heart rate is always high because of her a-fib.  Of note, she does use 2L oxygen at night when sleeping.  She follows with Dr. Cabrera at [facility] for Cardiology.  A-Fib Hx: Started in 20?s, per Cardiology note on 8/2/2021: \"She failed DC  cardioversion, dofetilide and many other antiarrhythmics as well as repeated cardioversions. She also failed PVI.\" Currently on Digoxin 125mcg, warfarin 5mg, and metoprolol tartrate 25mg QID.  Echo Hx: EF 55% 9/4/2020  12 Point ROS negative unless otherwise specified above.   ED Course:  -Vitals: Temp 37.4C , HR 108bpm, SPO2 93% on RA, RR 18, BP  95/66 mmHg  -Labs:  CBC: WBC 7.7, Hb 13.6, Plt 240, Hct 41.4, ANC 6.06, ALC 0.70  CMP: Glu 117, Na 134, K 3.6, Cl 96, HCO3 31, BUN 15, Cr.85, Ca 8.2, LFT wnl  PT/INR: 44.8, 3.9  Troponin: 15à14  BNP: 403   VBG: pH 7.44, pCO2 49, pO2 23, Bicarb 33.3    COVID: Positive  -EKG: A-fib, " RVR, rate 112.  LAD.  Questionable old septal infarct.  QTc 450 ms.  No STEMI.    -Imaging:    -CXR: Cardiomegaly without evidence of acute disease in the chest.  Scattered scarring in the lung bases and left upper lobe.  -Meds &Fluids   -2L NS   -Vancomycin   -Cefepime    -Flagyl  Past Medical History: A-Fib with RVR on Coumadin, CHF, Sarcoidosis, Hypothyroidism, asthma   Allergies: Penicillin  Surgical History: No recent surgeries  Medications: See EMR  Immunizations: Endorses receiving COVID vaccine plus boosters  Social History:    -Tobacco: Non-smoker   -Alcohol: Denies alcohol use      Comorbidities:   Comorbidites:  ·  Comorbid Conditions atrial fibrillation   ·  Type of Atrial Fibrillation Long-standing persistent     Social History:   Social History:  Smoking Status former smoker (1)   Alcohol Use denies(1)   Drug Use denies (1)              Intolerances:  ·  penicillin : Unknown    Medications Prior to Admission:     warfarin 5 mg oral tablet: 1 tab(s) orally once a day (at bedtime) except tuesdays  digoxin 125 mcg (0.125 mg) oral tablet: 1 tab(s) orally once a day (in the evening)  spironolactone 25 mg oral tablet: 1 tab(s) orally once a day (in the morning)  levothyroxine 75 mcg (0.075 mg) oral tablet: 1 tab(s) orally once a day (in the morning)  torsemide 20 mg oral tablet: 1 tab(s) orally 2 times a day, in the morning and at noon  Metoprolol Tartrate 25 mg oral tablet: 1 tab(s) orally 3 times a day  Pulmicort Flexhaler 180 mcg/inh inhalation powder: 1 puff(s) inhaled once a day (in the morning)  midodrine 2.5 mg oral tablet: 1 tab(s) orally once a day (in the morning)  estradiol 0.1 mg/g vaginal cream: vaginal 3 times a week  warfarin 7.5 mg oral tablet: 1 tab(s) orally once a week on Tuesdays.    Objective:     Objective Information:      T   P  R  BP   MAP  SpO2   Value  37.4  110  16  89/68   77  96%  Date/Time 9/28 21:20 9/29 0:17 9/29 0:17 9/29 0:17  9/29 0:17 9/29 0:17  Range  (37.4C - 37.4C )   (100 - 112 )  (16 - 20 )  (89 - 98 )/ (62 - 73 )  (70 - 83 )  (93% - 96% )  Highest temp of 37.4 C was recorded at 9/28 21:20    Physical Exam Narrative:  ·  Physical Exam:    General: patient appears tired, alert, oriented, in no acute distress  HENT:  NC/AT, oral mucosa moist without lesions  Eyes:  clear sclera, anicteric, EOMI  CV: irregularly irregular rhythm with mild tachycardia, no JVD, 2+ pulses bilaterally, no lower extremity edema  Resp: clear to auscultation bilaterally, no wheezes, rales or rhonchi, non-labored breathing, symmetric chest wall expansion  Abd:  Soft, non-tender, non-distended. No rebound, rigidity or guarding.   Neuro: gross muscle strength and sensation intact, CN II-XII grossly intact  Extremities: no pitting edema, splint on right wrist from recent wrist fracture  Skin: Warm, dry, intact      Recent Lab Results:    Results:    CBC: 9/28/2023 21:40              \     Hgb     /                              \     13.6       /  WBC  ----------------  Plt               7.7       ----------------    240              /     Hct     \                              /     41.4       \            RBC: 4.06     MCV: 102 H    Neutrophil %: 79.1      CMP: 9/28/2023 21:40  NA+        Cl-     BUN  /                         134 L   96 L    15  /  --------------------------------  Glucose                ---------------------------  117 H    K+     HCO3-   Creat \                         3.6    31    0.85  \           \  T Bili  /                    \  0.6  /  AST  x ---- x ALT        22 x ---- x 19         /  Alk P   \               /  66  \  Calcium : 8.2 L    Anion Gap : 11     Albumin : 3.4     T Protein : 6.2 L           Coagulation: 9/28/2023 21:40  PT  /                    44.8 H /  -------<    INR          ----------<      3.9 H  PTT\                              \                         I have reviewed these laboratory results:    Coronavirus 2019 by PCR  28-Sep-2023 22:25:00      Result  Value    Fluid Source  Nasal, Nasopharyngeal    Coronavirus 2019,PCR  DETECTED    Reference Range: Not Detected .  This test has received FDA Emergency Use Authorization (EUA) and has been   verified by [facility]. This test is only   authorized for the duration of time that circumstances   A     Venous Full Panel  28-Sep-2023 22:22:00      Result Value    pH, Venous  7.44   H   pCO2, Venous  49    pO2, Venous  23   L   SO2, Venous  40   L   Bicarbonate, Calculated, Venous  33.3   H   HGB, Venous  17.9   H   Anion Gap Level, Venous  2   L   Base Excess, Venous  7.4   H   Calcium, Ionized Venous  1.07   L   Chloride Level  95   L   Glucose Level, Venous  123   H   HCT CALCULATED, Venous  54.0   H   Lactate Level, Venous  1.0    OXY HGB, Venous  39.8   L   Patient Temperature, Venous  37.0    Potassium Level, Venous  3.5    Sodium Level, Venous  127   L       Radiology Results:    Results:        Impression:    Cardiomegaly without evidence of acute disease in the chest.     Scattered scarring in the lung bases and left upper lobe.     MACRO:  None.     Xray Chest 2 View PA + Lateral [Sep 28 2023 11:59PM]      Impression:    Senescent changes. No acute intracranial abnormality. If persistent  concern, consider MRI.     MACRO:  None.     CT Head without Contrast [Sep 28 2023 10:42PM]      Impression:    1.  Heart size is enlarged. Mild bibasilar atelectasis. No overt  stigmata of pneumonia or pulmonary edema. Consider two-view chest  radiograph when feasible           MACRO:  None     Xray Chest 1 View [Sep 28 2023 10:39PM]      Assessment and Plan:   Assessment:    Assessment: Ms. Sydney Kidd is an 83 year old female with PMHx relevant for persistent A-Fib on coumadin, CHF, sarcoidosis, hypothyroidism, and asthma who presented  to [facility] via ambulance with one day history of generalized weakness found to be COVID +.    Acute Medical issues:  #Sepsis 2/2 COVID Infection  #Generalized  "Weakness  #Hypotension  -One day history of generalized weakness where patient could not even stand     -Previously was fully independent with ADLs and IADLs living by herself  -COVID + in the ED  -Received 2L NS for hypotension (95/66) in the ED     -BP continue to be low (89/68), but patient states her BPs are always \"in the 80s\"  -Received Cefepime, Flagyl, and Vanc in the ED  -CBC: WBC 7.7, Hb 13.6, Plt 240, Hct 41.4, ANC 6.06, ALC 0.70  -CT Head: Senescent changes. No acute intracranial abnormality  -Vaccination status: Vaccinated  -VBG: pH 7.44, pCO2 49, pO2 23, Bicarb 33.3  Plan:  -Start on Remdesivir and dexamethasone for COVID infection     -Monitor LFTs in the setting of Remdesivir use  -Started 2L NC for SpO2 91%. Pt also baseline on 2L NC at night     -Consider weaning back to room air in the AM  -PT/OT for generalized weakness    #Atrial Fibrillation with RVR  #Hypotension  -Follows with Cardiologist  Dr. Cabrera at [facility]  -A-Fib Hx: Started in 20?s, per Cardiology note on 8/2/2021: \"She failed DC cardioversion, dofetilide and many other antiarrhythmics as well as repeated cardioversions. She also failed  PVI.\"  -EKG in ED showed A-fib with RVR     -Continually tachycardic with HR range 100-112  -Home regimen: Warfarin 5mg QD with 7.5mg once weekly, Digoxin 125mcg QD, and metoprolol tartrate 25mg QID  -Supratherapeutic INR of 3.9 in the ED  -Hypotensive with BP range 95-66 to 89/68     -Takes midodrine 2.5mg QAM at home     -States BP typically runs \"In the 80's\"    Plan:  -Continue home medications of metoprolol and digoxin  -Cardiac monitoring  -Midodrine 2.5mg BID  -Hold warfarin due to supratherapeutic INR     -Trend PT/INR, and resume warfarin once INR returns to therapeutic range (2-3)    #CHF  -Home regimen: Torsemide 20mg BID and Spironolactone 25mg QD  -Echo Hx: EF 50-55% in 2020  -Denies history of any hospital admissions for CHF  -On 2L NC at night  -CXR in ED negative for any " "acute cardiopulmonary process    Plan:  -Continue 2L NC  -Holding torsemide and spironolactone 2/2 to HTN     -Can resume if patient becomes volume overloaded, and blood pressure tolerates    Chronic medical issues:  #Hypothyroidism  -Continue home levothyroxine    #Asthma  -Home regimen: Budesonide 180mcg BID  -Mometasone 220mcg BID while inpatient    Fluids:  prn  Electrolytes: replete prn   Nutrition: Cardiac diet  GI prophylaxis: Protonix 40mg  VTE prophylaxis: SCD  Supp O2: 2L NC    Code Status:     Disposition: Admitted for generalized weakness secondary to COVID infection. Anticipate length of stay > 48 hours.    Brian Brennan MD  PGY-1 Transitional Year      Attestation:   Note Completion:  I am a:  Resident/Fellow   Attending Attestation I saw and evaluated the patient.  I personally obtained the key and critical portions of the history and physical exam or was physically present for key and  critical portions performed by the resident/fellow. I reviewed the resident/fellow?s documentation and discussed the patient with the resident/fellow.  I agree with the resident/fellow?s medical decision making as documented in the note.     I personally evaluated the patient on 29-Sep-2023         Electronic Signatures:  Autumn Taylor)  (Signed 29-Sep-2023 14:30)   Authored: Note Completion   Co-Signer: History of Present Illness, Comorbidities, Social History, Allergies, Medications Prior to Admission, Objective, Assessment  and Plan, Note Completion  Brian Brennan (Resident))  (Signed 29-Sep-2023 07:06)   Authored: History of Present Illness, Comorbidities,  Social History, Allergies, Medications Prior to Admission, Objective, Assessment and Plan, Note Completion      Last Updated: 29-Sep-2023 14:30 by Autumn Taylor)    References:  1.  Data Referenced From \"Risk Screen - Adult Emergency\" 28-Sep-2023 21:29   "

## 2023-09-30 NOTE — DISCHARGE INSTRUCTIONS
"Dear Ms. Kidd,    You were hospitalized with COVID-19 infection.  You were treated with remdesivir for 2 days. Since you returned back to your baseline while admitted with us, we discontinued your remdesivir on discharge. The only medication change that you need to make is:  - STOP taking warfarin 7.5mg on Tuesdays. Instead take warfarin 5mg EVERY DAY.  Additionally, the walked that you liked in the hospital is called \"[employer] - Deluxe Two Button Folding Walker with 5\" Wheels\". You can buy this at [employer], [employer], etc.  You do not need to  any additional medications at the pharmacy.    Best Wishes,  Your [facility] Medicine Team  "

## 2023-10-01 LAB
ALBUMIN (G/DL) IN SER/PLAS: NORMAL
ANION GAP IN SER/PLAS: NORMAL
C REACTIVE PROTEIN (MG/L) IN SER/PLAS BY HIGH SENSIT: NORMAL
CALCIUM (MG/DL) IN SER/PLAS: NORMAL
CARBON DIOXIDE, TOTAL (MMOL/L) IN SER/PLAS: NORMAL
CHLORIDE (MMOL/L) IN SER/PLAS: NORMAL
CREATININE (MG/DL) IN SER/PLAS: NORMAL
ERYTHROCYTE DISTRIBUTION WIDTH (RATIO) BY AUTOMATED COUNT: NORMAL
ERYTHROCYTE MEAN CORPUSCULAR HEMOGLOBIN CONCENTRATION (G/DL) BY AUTOMATED: NORMAL
ERYTHROCYTE MEAN CORPUSCULAR VOLUME (FL) BY AUTOMATED COUNT: NORMAL
ERYTHROCYTES (10*6/UL) IN BLOOD BY AUTOMATED COUNT: NORMAL
GFR FEMALE: NORMAL
GFR MALE: NORMAL
GLUCOSE (MG/DL) IN SER/PLAS: NORMAL
HEMATOCRIT (%) IN BLOOD BY AUTOMATED COUNT: NORMAL
HEMOGLOBIN (G/DL) IN BLOOD: NORMAL
INR IN PPP BY COAGULATION ASSAY: NORMAL
LEUKOCYTES (10*3/UL) IN BLOOD BY AUTOMATED COUNT: NORMAL
MAGNESIUM (MG/DL) IN SER/PLAS: NORMAL
NRBC (PER 100 WBCS) BY AUTOMATED COUNT: NORMAL
PHOSPHATE (MG/DL) IN SER/PLAS: NORMAL
PLATELETS (10*3/UL) IN BLOOD AUTOMATED COUNT: NORMAL
POTASSIUM (MMOL/L) IN SER/PLAS: NORMAL
PROTHROMBIN TIME (PT) IN PPP BY COAGULATION ASSAY: NORMAL
SODIUM (MMOL/L) IN SER/PLAS: NORMAL
UREA NITROGEN (MG/DL) IN SER/PLAS: NORMAL

## 2023-10-03 LAB
BLOOD CULTURE: NORMAL
BLOOD CULTURE: NORMAL

## 2023-10-05 DIAGNOSIS — S52.501A TRAUMATIC CLOSED NONDISPLACED FRACTURE OF DISTAL END OF RADIUS, RIGHT, INITIAL ENCOUNTER: ICD-10-CM

## 2023-10-07 PROBLEM — E03.9 ACQUIRED HYPOTHYROIDISM: Status: ACTIVE | Noted: 2018-04-02

## 2023-10-07 PROBLEM — M65.342 TRIGGER RING FINGER OF LEFT HAND: Status: ACTIVE | Noted: 2023-10-07

## 2023-10-07 PROBLEM — K22.4 DYSKINESIA OF ESOPHAGUS: Status: ACTIVE | Noted: 2022-01-01

## 2023-10-07 PROBLEM — R29.6 FREQUENT FALLS: Status: ACTIVE | Noted: 2022-02-24

## 2023-10-07 PROBLEM — S52.501A DISTAL RADIUS FRACTURE, RIGHT: Status: ACTIVE | Noted: 2023-10-07

## 2023-10-07 PROBLEM — Z99.81 SUPPLEMENTAL OXYGEN DEPENDENT: Status: ACTIVE | Noted: 2022-01-01

## 2023-10-07 PROBLEM — D68.9 COAGULOPATHY (MULTI): Status: ACTIVE | Noted: 2019-12-05

## 2023-10-07 PROBLEM — G56.03 BILATERAL CARPAL TUNNEL SYNDROME: Status: ACTIVE | Noted: 2019-11-12

## 2023-10-07 PROBLEM — R26.89 BALANCE DISORDER: Status: ACTIVE | Noted: 2021-07-15

## 2023-10-07 PROBLEM — M17.12 PRIMARY OSTEOARTHRITIS OF LEFT KNEE: Status: ACTIVE | Noted: 2022-03-29

## 2023-10-07 PROBLEM — R55 SYNCOPE AND COLLAPSE: Status: ACTIVE | Noted: 2022-09-16

## 2023-10-07 PROBLEM — I10 HYPERTENSIVE DISORDER: Status: ACTIVE | Noted: 2017-11-08

## 2023-10-07 PROBLEM — I42.8 OTHER CARDIOMYOPATHIES (MULTI): Status: ACTIVE | Noted: 2022-01-01

## 2023-10-07 PROBLEM — I11.0 HYPERTENSIVE HEART DISEASE WITH HEART FAILURE (MULTI): Status: ACTIVE | Noted: 2022-01-01

## 2023-10-07 PROBLEM — K21.9 GASTROESOPHAGEAL REFLUX DISEASE WITHOUT ESOPHAGITIS: Status: ACTIVE | Noted: 2022-01-01

## 2023-10-07 PROBLEM — I50.9 CONGESTIVE HEART FAILURE (MULTI): Status: ACTIVE | Noted: 2017-11-08

## 2023-10-07 PROBLEM — M18.12 ARTHRITIS OF CARPOMETACARPAL (CMC) JOINT OF LEFT THUMB: Status: ACTIVE | Noted: 2021-04-22

## 2023-10-07 PROBLEM — D86.9 SARCOIDOSIS: Status: ACTIVE | Noted: 2017-11-08

## 2023-10-07 PROBLEM — M19.90 ARTHRITIS: Status: ACTIVE | Noted: 2018-04-20

## 2023-10-07 PROBLEM — H90.3 SENSORINEURAL HEARING LOSS, BILATERAL: Status: ACTIVE | Noted: 2021-07-15

## 2023-10-07 PROBLEM — J44.9 COPD (CHRONIC OBSTRUCTIVE PULMONARY DISEASE) (MULTI): Status: ACTIVE | Noted: 2022-01-01

## 2023-10-07 PROBLEM — Z04.9 CONDITION NOT FOUND: Status: ACTIVE | Noted: 2023-10-07

## 2023-10-07 PROBLEM — I07.1 TRICUSPID VALVE REGURGITATION: Status: ACTIVE | Noted: 2022-07-05

## 2023-10-07 PROBLEM — M81.0 OSTEOPOROSIS WITHOUT CURRENT PATHOLOGICAL FRACTURE: Status: ACTIVE | Noted: 2022-02-24

## 2023-10-07 PROBLEM — I48.91 ATRIAL FIBRILLATION WITH RAPID VENTRICULAR RESPONSE (MULTI): Status: ACTIVE | Noted: 2022-09-16

## 2023-10-07 RX ORDER — HYDROCODONE BITARTRATE AND ACETAMINOPHEN 5; 325 MG/1; MG/1
TABLET ORAL
COMMUNITY
Start: 2023-04-10

## 2023-10-07 RX ORDER — ENOXAPARIN SODIUM 100 MG/ML
0.3 INJECTION SUBCUTANEOUS 2 TIMES DAILY
COMMUNITY
Start: 2022-04-13

## 2023-10-07 RX ORDER — OXYCODONE HYDROCHLORIDE 5 MG/1
5 TABLET ORAL EVERY 8 HOURS
COMMUNITY
Start: 2022-04-13

## 2023-10-07 RX ORDER — LANOLIN ALCOHOL/MO/W.PET/CERES
1000 CREAM (GRAM) TOPICAL DAILY
COMMUNITY

## 2023-10-07 RX ORDER — MAGNESIUM CARB,CITRATE,OXIDE 300 MG
1 TABLET ORAL EVERY OTHER DAY
COMMUNITY
Start: 2022-04-14

## 2023-10-07 RX ORDER — EMOLLIENT COMBINATION NO.110
1 LOTION (ML) TOPICAL 4 TIMES DAILY
COMMUNITY
Start: 2022-04-13

## 2023-10-07 RX ORDER — SELENIUM 50 MCG
200 TABLET ORAL DAILY
COMMUNITY
Start: 2015-01-26

## 2023-10-07 RX ORDER — ASCORBIC ACID 500 MG
500 TABLET ORAL DAILY
COMMUNITY

## 2023-10-07 RX ORDER — TURMERIC 100 %
4 POWDER (GRAM) MISCELLANEOUS
COMMUNITY
Start: 2018-04-23

## 2023-10-07 RX ORDER — DILTIAZEM HYDROCHLORIDE 240 MG/1
240 CAPSULE, COATED, EXTENDED RELEASE ORAL DAILY
COMMUNITY
Start: 2022-03-02

## 2023-10-07 RX ORDER — CEPHALEXIN 500 MG/1
CAPSULE ORAL
COMMUNITY
Start: 2023-04-28

## 2023-10-07 RX ORDER — ALBUTEROL SULFATE 90 UG/1
2 AEROSOL, METERED RESPIRATORY (INHALATION) EVERY 6 HOURS PRN
COMMUNITY
Start: 2022-09-29

## 2023-10-07 RX ORDER — UMECLIDINIUM 62.5 UG/1
AEROSOL, POWDER ORAL
COMMUNITY
Start: 2023-03-15

## 2023-10-07 RX ORDER — VIT C/E/ZN/COPPR/LUTEIN/ZEAXAN 250MG-90MG
1000 CAPSULE ORAL DAILY
COMMUNITY
Start: 2022-07-11

## 2023-10-07 RX ORDER — AMOXICILLIN 500 MG/1
2000 TABLET, FILM COATED ORAL
COMMUNITY
Start: 2023-02-10

## 2023-10-07 RX ORDER — ACETAMINOPHEN 325 MG/1
650 TABLET ORAL EVERY 4 HOURS
COMMUNITY
Start: 2022-04-13

## 2023-10-07 RX ORDER — TIZANIDINE 2 MG/1
2 TABLET ORAL EVERY 8 HOURS
COMMUNITY
Start: 2022-04-13

## 2023-10-07 RX ORDER — SODIUM CHLORIDE 0.9 % (FLUSH) 0.9 %
SYRINGE (ML) INJECTION
COMMUNITY
Start: 2022-08-22 | End: 2023-11-21

## 2023-10-09 ENCOUNTER — HOSPITAL ENCOUNTER (OUTPATIENT)
Dept: RADIOLOGY | Facility: HOSPITAL | Age: 83
Discharge: HOME | End: 2023-10-09
Payer: MEDICARE

## 2023-10-09 ENCOUNTER — OFFICE VISIT (OUTPATIENT)
Dept: ORTHOPEDIC SURGERY | Facility: CLINIC | Age: 83
End: 2023-10-09
Payer: MEDICARE

## 2023-10-09 DIAGNOSIS — S62.101S RIGHT WRIST FRACTURE, SEQUELA: ICD-10-CM

## 2023-10-09 DIAGNOSIS — Z87.39 HISTORY OF BACK PAIN: ICD-10-CM

## 2023-10-09 DIAGNOSIS — S52.501A TRAUMATIC CLOSED NONDISPLACED FRACTURE OF DISTAL END OF RADIUS, RIGHT, INITIAL ENCOUNTER: ICD-10-CM

## 2023-10-09 PROCEDURE — 73110 X-RAY EXAM OF WRIST: CPT | Mod: RT

## 2023-10-09 PROCEDURE — 1036F TOBACCO NON-USER: CPT | Performed by: ORTHOPAEDIC SURGERY

## 2023-10-09 PROCEDURE — 99024 POSTOP FOLLOW-UP VISIT: CPT | Performed by: ORTHOPAEDIC SURGERY

## 2023-10-09 PROCEDURE — 1111F DSCHRG MED/CURRENT MED MERGE: CPT | Performed by: ORTHOPAEDIC SURGERY

## 2023-10-09 PROCEDURE — 73110 X-RAY EXAM OF WRIST: CPT | Mod: RIGHT SIDE | Performed by: RADIOLOGY

## 2023-10-09 PROCEDURE — 1159F MED LIST DOCD IN RCRD: CPT | Performed by: ORTHOPAEDIC SURGERY

## 2023-10-09 PROCEDURE — 1126F AMNT PAIN NOTED NONE PRSNT: CPT | Performed by: ORTHOPAEDIC SURGERY

## 2023-10-09 NOTE — PROGRESS NOTES
GUNNER GUAMAN is a 83 year female who follows up today with right wrist pain after fall. Patient reports she was working out with bands when one broke and she fell back onto the wrist. The DOI was 8/3, 2 monthss ago. Patient reports chronic numbness and tingling, getting worse. Reports no past surgeries, injections, or trauma to the area except CTR. Splint has been worn as directed. Pain worse with use, better with rest. Pain dull ache, sharp at times. RHD. States doing better, wears brace to use walker and at night to sleep for comfort only.  States doing much better.     Review of Systems    Constitutional: no fever, no chills, not feeling tired, no recent weight gain and no recent weight loss.   ENT: no nosebleeds.   Cardiovascular: no chest pain.   Respiratory: no shortness of breath and no cough.   Gastrointestinal: no abdominal pain, no nausea, no vomiting and no diarrhea.   Musculoskeletal: per HPI  Integumentary: no rashes and no skin wound.   Neurological: no headache.   Psychiatric: no depression and no sleep disturbances.   Endocrine: no muscle weakness and no muscle cramps.   Hematologic/Lymphatic: no swollen glands and no tendency for easy bruising.     All other systems have been reviewed and are negative for complaint    Patient's past medical history, past surgical history, allergies, and medications have been reviewed unless otherwise noted in the chart.    Wrist Exam  Inspection:  no evidence of infection, no erythema, no edema, no ecchymosis, Palpation:  compartments are soft, no tenderness with palpation, Range of Motion:  full wrist S/P, 40/60 F/E, full finger range of motion, Stability:  no wrist instability, Strength:  5/5 wrist and finger flexion/extension, Skin:  intact, Vascular:  capillary refill <2 seconds distally, Sensation:  sensation intact to light touch distally, Other:  no pain with palpation or motion proximally in the elbow.       Constitutional   General appearance: Alert and in  no acute distress. Well developed, well nourished.    Eyes   External Eye, Conjunctiva and lids: Normal external exam - pupils were equal in size, round, reactive to light (PERRL) with normal accommodation and extraocular movements intact (EOMI).   Ears, Nose, Mouth, and Throat   Hearing: Normal.   Neck   Neck: No neck mass was observed. Supple.   Pulmonary   Respiratory effort: No respiratory distress.   Cardiovascular   Examination of extremities: No peripheral edema.   Abdomen   Abdomen: Soft nontender; no abdominal mass palpated.. No rebound, rigidity or guarding.    Skin   Skin and subcutaneous tissue: Normal skin color and pigmentation, normal skin turgor, and no rash.   Neurologic   Sensation: Normal.   Psychiatric   Judgment and insight: Intact.   Orientation to person, place, and time: Alert and oriented x 3.       Mood and affect: Normal.     XR - no change in position or alignmnt, interval healing     2 months s/p right wrist fx  I discussed the diagnosis and treatment options with the patient today along with their associated risks and benefits. After thorough discussion, the patient has elected to proceed with conservative management. All questions were answered to the patients satisfaction who seems satisfied with the plan.      Splint prn  OT - E/t  Fu 4-6 weeks prn - XR    2.  Low back pain  I discussed the diagnosis and treatment options with the patient today along with their associated risks and benefits. After thorough discussion, the patient has elected to proceed with conservative management. All questions were answered to the patients satisfaction who seems satisfied with the plan.      PT  FU 8 weeks prn - No XR

## 2023-10-26 ENCOUNTER — EVALUATION (OUTPATIENT)
Dept: OCCUPATIONAL THERAPY | Facility: HOSPITAL | Age: 83
End: 2023-10-26
Payer: MEDICARE

## 2023-10-26 DIAGNOSIS — R29.898 WEAKNESS OF RIGHT HAND: ICD-10-CM

## 2023-10-26 DIAGNOSIS — M79.641 PAIN IN RIGHT HAND: ICD-10-CM

## 2023-10-26 DIAGNOSIS — S62.101S RIGHT WRIST FRACTURE, SEQUELA: Primary | ICD-10-CM

## 2023-10-26 PROCEDURE — 97110 THERAPEUTIC EXERCISES: CPT | Mod: GO

## 2023-10-26 PROCEDURE — 97165 OT EVAL LOW COMPLEX 30 MIN: CPT | Mod: GO

## 2023-10-26 ASSESSMENT — PAIN - FUNCTIONAL ASSESSMENT
PAIN_FUNCTIONAL_ASSESSMENT: 0-10
PAIN_FUNCTIONAL_ASSESSMENT: 0-10

## 2023-10-26 ASSESSMENT — ENCOUNTER SYMPTOMS
OCCASIONAL FEELINGS OF UNSTEADINESS: 1
DEPRESSION: 0
LOSS OF SENSATION IN FEET: 0

## 2023-10-26 ASSESSMENT — PATIENT HEALTH QUESTIONNAIRE - PHQ9
2. FEELING DOWN, DEPRESSED OR HOPELESS: NOT AT ALL
SUM OF ALL RESPONSES TO PHQ9 QUESTIONS 1 AND 2: 0
1. LITTLE INTEREST OR PLEASURE IN DOING THINGS: NOT AT ALL

## 2023-10-26 ASSESSMENT — PAIN SCALES - GENERAL
PAINLEVEL_OUTOF10: 2
PAINLEVEL_OUTOF10: 2

## 2023-10-26 NOTE — LETTER
October 26, 2023     Patient: Sydney Kidd   YOB: 1940   Date of Visit: 10/26/2023       To Whom it May Concern:    Sydney Kidd was seen in my clinic on 10/26/2023. She {Return to school/sport:66117}.    If you have any questions or concerns, please don't hesitate to call.         Sincerely,          Sergio Johnson, OT        CC: No Recipients

## 2023-10-26 NOTE — PROGRESS NOTES
Occupational Therapy    Occupational Therapy Evaluation  Visit: 1  Name: Sydney Kidd  MRN: [id number]  : 1940  Date: 10/26/23  Time Calculation  Start Time: 1630  Stop Time: 1730  Time Calculation (min): 60 min  DOI: 23  DOS: NA    Assessment:  OT Assessment  OT Assessment Results: Decreased ADL status, Decreased upper extremity range of motion, Decreased upper extremity strength, Decreased IADLs  Patient is an 83 year old female who presents to this facility with performance deficits in ROM, strength and general conditioning that compromises her efficiency in her ADLs and IADLs.     Plan:  Continue to address ROM, strength, and pain management to improve efficiency with ADLs/IADLs.    Outpatient Plan  OT Plan: Occupational therapy intervention plan to include education/instruction, electrical stimulation, hot pack, ultrasound, manual therapy, orthotic fitting/training, self-care/home management, therapeutic exercises, therapeutic activities, and home program.  Duration: 12 weeks  Rehab Potential: Good  Plan of Care Agreement: Patient    Subjective   Current Problem:  1. Right wrist fracture, sequela  Referral to Occupational Therapy    Follow Up In Occupational Therapy      2. Weakness of right hand  Follow Up In Occupational Therapy      3. Pain in right hand  Follow Up In Occupational Therapy        General:    Patient reports she was exercising with therabands when the band broke and she fell (8/3/23). She was brought to the ER and determined she fx her right wrist. Patient opted for conservative management. She reports having pain with movement and difficulty using her hands while cooking. She reports numbness and tingling in bilateral fingers secondary to CTS. She received CTR on bilateral hands, however, still noticing numbness, tingling, and pain.     Goal for OT: Get back to normal routine         Precautions:   Tolerance. High Fall Risk,. Steadi Score 9    Pain Assessment:  Pain  Assessment  Pain Assessment: 0-10  Pain Score: 2 (Patient reporting 7/10 pain with strenuous movements.)  Patient reporting pain in back and left hip secondary to arthritis.       Objective      Home Living:   Patient lives in a house alone.     Prior Function Per Pt/Caregiver Report:   Pt reporting IND with ADL and IADL tasks.    IADL History:   Patient reporting difficulty with opening cans secondary to decreased strength, movement and pain in right wrist. Patient having difficulty lifting items, including her coffee pot and pans when she is cooking.     Work: Retired    ADL:   Patient is independent, however, requiring extended time and causes pain.        Wrist Measurements:  WFL unless documented below   Flexion  Extension Radial Dev Ulnar Dev Supination Pronation   Right 30 45 15 20 70 90   Left 30 40 20 30 85         85       :  Average taken from best 3 measurements.   Trials Average   RUE 21,21,23,20 22   LUE 25,26,23,25 25        Outcome Measures:     Quickdash Scores: To be assessed in future session.     OP EDUCATION:  Education  Individual(s) Educated: Patient  Education Provided: Diagnosis & Precautions, Symptom management, Fall precautons, POC discussed and agreed upon, Risk and benefits of OT discussed with patient or other  Home Program: AROM, Handout issued  Risk and Benefits Discussed with Patient/Caregiver/Other: yes  Patient/Caregiver Demonstrated Understanding: yes  Plan of Care Discussed and Agreed Upon: yes  Patient Response to Education: Patient/Caregiver Verbalized Understanding of Information    Patient provided education on A,AROM and theraputty exercises for her HEP. Handouts were provided and she was instructed to complete 4-8 times a day, 5 reps each, completing pull, push, hold for the 4th and 5th rep. Patient verbalized/demonstrated understanding.     Exercises: Reps:   Wrist ROM- 5 position ROM Reps 1-3 AROM  Reps 4 and 5 AAROM (pull, push and hold)   Strength- Therapy Putty -  Yellow  Squeeze 5x each    Pinch    Abd/ Add       Activities:                    Modalities:                    Manual:                    Functional review:     Completed on:          Goals:  Active       OT Goals       OT Goal 1       Start:  10/26/23    Expected End:  01/18/24       LTG - Patient will indicate/ demonstrate the ability to improve efficiency of ADLs and IADLs without significant limits secondary to decreased ROM, decreased strength and/or pain as indicated by Quickdash score of less than 20%.           OT Goal 2       Start:  10/26/23    Expected End:  01/18/24       Develop and issue HEP to help maximize ROM, strength and tolerance to help maximize return to all pre-onset activities.          OT Goal 3       Start:  10/26/23    Expected End:  01/18/24       Pain to be less than or equal to 2/10 with greater than or equal to 45 minutes activity.          OT Goal 4       Start:  10/26/23    Expected End:  01/18/24       Patient will demonstrate a progressive increase in ROM as appropriate with bilateral  to be greater than or equal to 30# to help patient improve efficiency of ADL/IADL function.            OT Goal 5       Start:  10/26/23    Expected End:  01/18/24       Patient will demonstrate a progressive increase in wrist ROM to help improve efficiency with ADL/IADL function, as evidenced by improved QuickDash score of less than 20%

## 2023-10-26 NOTE — LETTER
October 26, 2023     Patient: Sydney Kidd   YOB: 1940   Date of Visit: 10/26/2023       To Whom It May Concern:    It is my medical opinion that Sydney iKdd {Work release (duty restriction):75859}.    If you have any questions or concerns, please don't hesitate to call.         Sincerely,        Sergio Johnson, OT    CC: No Recipients

## 2023-11-02 ENCOUNTER — TREATMENT (OUTPATIENT)
Dept: OCCUPATIONAL THERAPY | Facility: HOSPITAL | Age: 83
End: 2023-11-02
Payer: MEDICARE

## 2023-11-02 ENCOUNTER — APPOINTMENT (OUTPATIENT)
Dept: OCCUPATIONAL THERAPY | Facility: CLINIC | Age: 83
End: 2023-11-02
Payer: MEDICARE

## 2023-11-02 DIAGNOSIS — M79.641 PAIN IN RIGHT HAND: ICD-10-CM

## 2023-11-02 DIAGNOSIS — S62.101S RIGHT WRIST FRACTURE, SEQUELA: ICD-10-CM

## 2023-11-02 DIAGNOSIS — R29.898 WEAKNESS OF RIGHT HAND: ICD-10-CM

## 2023-11-02 PROCEDURE — 97110 THERAPEUTIC EXERCISES: CPT | Mod: GO

## 2023-11-02 ASSESSMENT — PAIN - FUNCTIONAL ASSESSMENT: PAIN_FUNCTIONAL_ASSESSMENT: 0-10

## 2023-11-02 ASSESSMENT — PAIN SCALES - GENERAL: PAINLEVEL_OUTOF10: 0 - NO PAIN

## 2023-11-02 NOTE — PROGRESS NOTES
Occupational Therapy    Occupational Therapy Treatment  Visit:     Name: Sydney Kidd  MRN: [id number]  : 1940  Date: 23  Time Calculation  Start Time: 1330  Stop Time: 1415  Time Calculation (min): 45 min    Assessment:    Pt completed therapeutic exercises to address strength and ROM of right wrist. Patient provided nerve glides and dycem to address numbness and tingling in bilateral hands. Patient tolerated adding wrist strengthening exercises to HEP without reports of pain or discomfort. Patient requiring MOD VC and hand over hand assist for exercises. Therapist emphasized importance of consistency with HEP to maximize outcomes. Pt completed exercises with some difficulty.      Plan:   Pt to continue POC as appropriate to address ROM and strength of right wrist to increase efficiency with ADLs/IADLs.     Subjective   General:   Patient reports she hasn't been doing her exercises much lately because her family comes on the weekends and she is busy cooking dinner. She reports that she has been in a lot of pain recently secondary to her arthritis in her neck and back. Patient having numbness and tingling in bilateral hands.      Pain Assessment:  Pain Assessment  Pain Assessment: 0-10  Pain Score: 0 - No pain (Patient reports some pain in wrist with movement. Reporting she is able to ignore the pain most days.)    Objective       Therapy:     Exercises: Reps:   Wrist ROM- 5 position ROM Reps 1-3 AROM  Reps 4 and 5 AAROM (pull, push and hold)   Strength- Therapy Putty - Yellow  Squeeze 5x each    Pinch    Abd/ Add   MH with passive stretch for improved tissue elasticity Digit extension   Nerve glides  1X5 reps    Wrist PREs:                                              Sup curls 2X10 reps 2#                                             Pron curls 2X10 reps 2#                                           Hammer sup/pron 2X10 reps 1#                                           Hammer RD/UD 2X10 reps 1#        Activities:                    Modalities:                    Manual:    Dycem STM Manual               Functional review:     Completed on:        OP Education:  Education  Diagnosis and Precautions: See IE  Instructed on strengthening exercises and nerve glides to add to HEP. Provided MOD VC and hand over hand assist for exercise instruction. Emphasized the importance of consistency with ROM and strengthening exercises to maximize outcomes. Patient verbalized/demonstrated understanding.     Goals:  Active       OT Goals       OT Goal 1       Start:  10/26/23    Expected End:  01/18/24       LTG - Patient will indicate/ demonstrate the ability to improve efficiency of ADLs and IADLs without significant limits secondary to decreased ROM, decreased strength and/or pain as indicated by Quickdash score of less than 20%.           OT Goal 2       Start:  10/26/23    Expected End:  01/18/24       Develop and issue HEP to help maximize ROM, strength and tolerance to help maximize return to all pre-onset activities.          OT Goal 3       Start:  10/26/23    Expected End:  01/18/24       Pain to be less than or equal to 2/10 with greater than or equal to 45 minutes activity.          OT Goal 4       Start:  10/26/23    Expected End:  01/18/24       Patient will demonstrate a progressive increase in ROM as appropriate with bilateral  to be greater than or equal to 30# to help patient improve efficiency of ADL/IADL function.            OT Goal 5       Start:  10/26/23    Expected End:  01/18/24       Patient will demonstrate a progressive increase in wrist ROM to help improve efficiency with ADL/IADL function, as evidenced by improved QuickDash score of less than 20%             The supervising therapist was present for the entire session and made all clinical decisions.

## 2023-11-09 ENCOUNTER — TREATMENT (OUTPATIENT)
Dept: OCCUPATIONAL THERAPY | Facility: HOSPITAL | Age: 83
End: 2023-11-09
Payer: MEDICARE

## 2023-11-09 DIAGNOSIS — S62.101S RIGHT WRIST FRACTURE, SEQUELA: ICD-10-CM

## 2023-11-09 DIAGNOSIS — R29.898 WEAKNESS OF RIGHT HAND: ICD-10-CM

## 2023-11-09 DIAGNOSIS — M79.641 PAIN IN RIGHT HAND: ICD-10-CM

## 2023-11-09 PROCEDURE — 97530 THERAPEUTIC ACTIVITIES: CPT | Mod: GO

## 2023-11-09 PROCEDURE — 97110 THERAPEUTIC EXERCISES: CPT | Mod: GO

## 2023-11-09 ASSESSMENT — PAIN - FUNCTIONAL ASSESSMENT: PAIN_FUNCTIONAL_ASSESSMENT: 0-10

## 2023-11-09 ASSESSMENT — PAIN SCALES - GENERAL: PAINLEVEL_OUTOF10: 0 - NO PAIN

## 2023-11-09 NOTE — PROGRESS NOTES
Occupational Therapy    Occupational Therapy Treatment  Visit: 3    Name: Sydney Kidd  MRN: [id number]  : 1940  Date: 23       Assessment:      Pt completed therapeutic exercises to address coordination, strength and ROM of wrist and digits, Pt tolerated strengthening exercises without report of discomfort or pain. Pt completed exercises with some difficulty.      Plan:     Pt to continue POC as appropriate to address coordination, ROM and strength to increase efficiency with IADLs.     Subjective   General:  Pt arrives today for OT. Pt not scheduled today. OT able to work with her. Pt reports no pain at rest. Some discomfort with the pro/ sup motion at end range.       Pain Assessment:  Pain Assessment  Pain Assessment: 0-10  Pain Score: 0 - No pain    Objective       Wrist Measurements: 10/26/23  WFL unless documented below   Flexion  Extension Radial Dev Ulnar Dev Supination Pronation   Right 30 45 15 20 70 90   Left 30 40 20 30 85         85       :  Average taken from best 3 measurements. 10/26/23   Trials Average   RUE 21,21,23,20 22   LUE 25,26,23,25 25        Therapy:  Exercises: Reps:   Wrist ROM- 5 position ROM Reps 1-3 AROM  Reps 4 and 5 AAROM (pull, push and hold)   Strength- Therapy Putty - Yellow             MH with passive stretch for improved tissue elasticity Digit extension   Nerve glides     Wrist PREs:                                              Sup curls 2X10 reps 2#                                             Pron curls 2X10 reps 2#                                           Hammer sup/pron 2X10 reps 1#                                           Hammer RD/UD 2X10 reps 1#       Activities:    Rubber Band Bord Off/ On   Velcro Loop Board Off/ On           Modalities:                    Manual:    Juventino LOVE                Functional review:     Completed on:        OP Education:   Education  Diagnosis and Precautions: See IE    Goals:  Active       OT Goals       OT Goal 1        Start:  10/26/23    Expected End:  01/18/24       LTG - Patient will indicate/ demonstrate the ability to improve efficiency of ADLs and IADLs without significant limits secondary to decreased ROM, decreased strength and/or pain as indicated by Quickdash score of less than 20%.           OT Goal 2       Start:  10/26/23    Expected End:  01/18/24       Develop and issue HEP to help maximize ROM, strength and tolerance to help maximize return to all pre-onset activities.          OT Goal 3       Start:  10/26/23    Expected End:  01/18/24       Pain to be less than or equal to 2/10 with greater than or equal to 45 minutes activity.          OT Goal 4       Start:  10/26/23    Expected End:  01/18/24       Patient will demonstrate a progressive increase in ROM as appropriate with bilateral  to be greater than or equal to 30# to help patient improve efficiency of ADL/IADL function.            OT Goal 5       Start:  10/26/23    Expected End:  01/18/24       Patient will demonstrate a progressive increase in wrist ROM to help improve efficiency with ADL/IADL function, as evidenced by improved QuickDash score of less than 20%

## 2023-11-16 ENCOUNTER — TREATMENT (OUTPATIENT)
Dept: OCCUPATIONAL THERAPY | Facility: HOSPITAL | Age: 83
End: 2023-11-16
Payer: MEDICARE

## 2023-11-16 DIAGNOSIS — S62.101S RIGHT WRIST FRACTURE, SEQUELA: Primary | ICD-10-CM

## 2023-11-16 DIAGNOSIS — M79.641 PAIN IN RIGHT HAND: ICD-10-CM

## 2023-11-16 DIAGNOSIS — R29.898 WEAKNESS OF RIGHT HAND: ICD-10-CM

## 2023-11-16 PROCEDURE — 97110 THERAPEUTIC EXERCISES: CPT | Mod: GO

## 2023-11-16 PROCEDURE — 97530 THERAPEUTIC ACTIVITIES: CPT | Mod: GO

## 2023-11-16 ASSESSMENT — ENCOUNTER SYMPTOMS
LOSS OF SENSATION IN FEET: 0
DEPRESSION: 0
OCCASIONAL FEELINGS OF UNSTEADINESS: 1

## 2023-11-16 ASSESSMENT — PAIN SCALES - GENERAL: PAINLEVEL_OUTOF10: 0 - NO PAIN

## 2023-11-16 ASSESSMENT — PAIN - FUNCTIONAL ASSESSMENT: PAIN_FUNCTIONAL_ASSESSMENT: 0-10

## 2023-11-16 NOTE — PROGRESS NOTES
Occupational Therapy    Occupational Therapy Treatment  Visit:     Name: Sydney Kidd  MRN: [id number]  : 1940  Date: 23  Time Calculation  Start Time: 1330  Stop Time: 1415  Time Calculation (min): 45 min    Assessment:    Patient reporting she had a fall two days ago and completed another STEADI. Patient is determined a HIGH fall risk. Patient completed therapeutic exercises to address strength and ROM of right wrist. Patient requiring MOD VC for wrist roll ups and pinch clip exercises. Patient tolerated adding strengthening exercises without reports of pain, some discomfort noted. Patient demonstrated improved ROM this date. Patient instructed on the importance of consistency with her HEP. Patient completed exercises with some difficulty.      Plan:   Pt to continue POC as appropriate to address ROM and strength of right wrist to increase efficiency with ADLs/IADLs. Consider adding BTE exercises as appropriate in future sessions.    Subjective   General:   Patient reports that she fell two days ago and so she has been trying to relax since. She reports that she fell on her right wrist and knee, but she is not having any pain. Patient reports she is seeing PT for her balance concerns at [place].     Precautions:   Patient completed another STEADI secondary to having a fall two days ago. Patient is a HIGH fall risk.   STEADI: 9    Pain Assessment:  Pain Assessment  Pain Assessment: 0-10  Pain Score: 0 - No pain    Objective       Therapy:  Exercises: Reps:   Wrist ROM- 5 position ROM Reps 1-3 AROM  Reps 4 and 5 AAROM (pull, push and hold)   Strength- Therapy Putty - Yellow             MH with passive stretch for improved tissue elasticity Digit extension   Nerve glides     Pinch clips:     Alt digits; yellow/red/green 1X10 clips each color    Hand gripper  20# 1X10 reps    Wrist PREs:                                              Sup curls                                              Pron curls                                             Hammer sup/pron                                            Hammer RD/UD                                        Wrist Roll ups 4 ways 2# 1X each direction   Activities:    Rubber Band Bord Off/ On   Velcro Loop Board Off/ On           Modalities:                    Manual:    Juventino LOVE                Functional review:     Completed on:        Wrist Measurements:  WFL unless documented below   Flexion  Extension Radial Dev Ulnar Dev Supination Pronation   Right 35 40 20 30 75 90        Wrist Measurements: 10/26/23  WFL unless documented below   Flexion  Extension Radial Dev Ulnar Dev Supination Pronation   Right 30 45 15 20 70 90   Left 30 40 20 30 85         85        :  Average taken from best 3 measurements.   Trials Average   RUE 21,25,24,24 24   LUE 30,25,30,25 28       OP Education:  Education  Diagnosis and Precautions: See IE  Patient educated on the importance of consistency with her HEP. Patient provided with a compression garment to address swelling of right wrist and instructed to remove if causing any pain or irritation. Patient instructed to not push pain. Patient verbalized/demonstrated understanding.      Goals:  Active       OT Goals       OT Goal 1 (Progressing)       Start:  10/26/23    Expected End:  01/18/24       LTG - Patient will indicate/ demonstrate the ability to improve efficiency of ADLs and IADLs without significant limits secondary to decreased ROM, decreased strength and/or pain as indicated by Quickdash score of less than 20%.           OT Goal 2 (Progressing)       Start:  10/26/23    Expected End:  01/18/24       Develop and issue HEP to help maximize ROM, strength and tolerance to help maximize return to all pre-onset activities.          OT Goal 3 (Progressing)       Start:  10/26/23    Expected End:  01/18/24       Pain to be less than or equal to 2/10 with greater than or equal to 45 minutes activity.          OT Goal 4 (Progressing)        Start:  10/26/23    Expected End:  01/18/24       Patient will demonstrate a progressive increase in ROM as appropriate with bilateral  to be greater than or equal to 30# to help patient improve efficiency of ADL/IADL function.            OT Goal 5 (Progressing)       Start:  10/26/23    Expected End:  01/18/24       Patient will demonstrate a progressive increase in wrist ROM to help improve efficiency with ADL/IADL function, as evidenced by improved QuickDash score of less than 20%             The supervising therapist was present for the entire session and made all clinical decisions.

## 2023-11-30 ENCOUNTER — APPOINTMENT (OUTPATIENT)
Dept: OCCUPATIONAL THERAPY | Facility: HOSPITAL | Age: 83
End: 2023-11-30
Payer: MEDICARE

## 2023-11-30 ENCOUNTER — DOCUMENTATION (OUTPATIENT)
Dept: OCCUPATIONAL THERAPY | Facility: HOSPITAL | Age: 83
End: 2023-11-30
Payer: MEDICARE

## 2023-11-30 NOTE — PROGRESS NOTES
Occupational Therapy                 Therapy Communication Note    Patient Name: Sydney Kidd  MRN: [id number]  Today's Date: 11/30/2023     Discipline: Occupational Therapy    Missed Visit Reason:  Patient ill.    Missed Time: Cancel    Comment:

## 2023-12-07 ENCOUNTER — TREATMENT (OUTPATIENT)
Dept: OCCUPATIONAL THERAPY | Facility: HOSPITAL | Age: 83
End: 2023-12-07
Payer: MEDICARE

## 2023-12-07 ENCOUNTER — APPOINTMENT (OUTPATIENT)
Dept: PAIN MEDICINE | Facility: HOSPITAL | Age: 83
End: 2023-12-07
Payer: MEDICARE

## 2023-12-07 DIAGNOSIS — M79.641 PAIN IN RIGHT HAND: ICD-10-CM

## 2023-12-07 DIAGNOSIS — R29.898 WEAKNESS OF RIGHT HAND: ICD-10-CM

## 2023-12-07 DIAGNOSIS — S62.101S RIGHT WRIST FRACTURE, SEQUELA: ICD-10-CM

## 2023-12-07 PROCEDURE — 97110 THERAPEUTIC EXERCISES: CPT | Mod: GO

## 2023-12-07 ASSESSMENT — PAIN - FUNCTIONAL ASSESSMENT: PAIN_FUNCTIONAL_ASSESSMENT: 0-10

## 2023-12-07 ASSESSMENT — PAIN SCALES - GENERAL: PAINLEVEL_OUTOF10: 0 - NO PAIN

## 2023-12-07 NOTE — PROGRESS NOTES
Occupational Therapy    Occupational Therapy Treatment  Visit:     Name: Sydney Kidd  MRN: [id number]  : 1940  Date: 23  Time Calculation  Start Time: 1300  Stop Time: 1345  Time Calculation (min): 45 min    Assessment:    Pt completed therapeutic exercises to address strength and ROM of right wrist. Pt tolerated increasing weight with wrist PREs without reports of pain or discomfort. Pt educated on theraband exercises to add to HEP and instructed to complete while seated or with a chair behind her. Pt notably fatigued after completing seated t-band exercises, however, reporting no pain. Pt completed exercises with some difficulty.      Plan:   Pt to continue POC as appropriate to address ROM and strength of right wrist to increase efficiency with ADLs/IADLs.     Subjective   General:   Patient reports that she has not been able to do many of her exercises since she has been sick. Patient reports that she has been able to do most things around her home, however, still having difficulty with opening cans and putting heavy items into the oven. Discussed mini electric can openers to assist.       Pain Assessment:  Pain Assessment  Pain Assessment: 0-10  Pain Score: 0 - No pain    Objective       Therapy:     Exercises: Reps:   Wrist ROM- 5 position ROM Reps 1-3 AROM  Reps 4 and 5 AAROM (pull, push and hold)   Strength- Therapy Putty - Yellow             MH with passive stretch  Completed prior to session to improve tissue extensibility   Nerve glides     Pinch clips:        Hand gripper      Wrist PREs: 25# 1X10 reps shoulder flexion; 1X10 reps shoulder ABD                                             Sup curls 1X10 reps 2#; 1X10 reps 3#                                              Pron curls 1X10 reps 2#;                                            Hammer sup/pron 2X10 reps                                           Hammer RD/UD 2X10 reps                                        Wrist Roll ups 4 ways     Theraband exercises:    Bicep curls 1X10 reps; red t-band   Horizontal ABD 1X10 reps; red t-band   Activities:    Rubber Band Bord    Velcro Loop Board            Modalities:                    Manual:    Juventino LOVE                Functional review:     Completed on: 12/07/23      :  Average taken from best 3 measurements.   Trials Average   RUE 25, 20, 22, 24 24   LUE 27, 25, 26, 26 26      taken from 11/16/23:  Average taken from best 3 measurements.   Trials Average   RUE 21,25,24,24 24   LUE 30,25,30,25 28       OP Education:  Education  Diagnosis and Precautions: See IE  Emphasized the importance of consistency with her HEP to continue to maximize outcomes. Discussed finding and utilizing electric can openers secondary to pt reporting difficulty with opening cans with manual one. Pt educated on t-band exercises to add to HEP and encouraged to complete while seated or with a chair behind her to avoid a fall while completing. Patient instructed to not push pain while completing exercises. Pt verbalized/demonstrated understanding.       Goals:  Active       OT Goals       OT Goal 1 (Progressing)       Start:  10/26/23    Expected End:  01/18/24       LTG - Patient will indicate/ demonstrate the ability to improve efficiency of ADLs and IADLs without significant limits secondary to decreased ROM, decreased strength and/or pain as indicated by Quickdash score of less than 20%.           OT Goal 2 (Progressing)       Start:  10/26/23    Expected End:  01/18/24       Develop and issue HEP to help maximize ROM, strength and tolerance to help maximize return to all pre-onset activities.          OT Goal 3 (Progressing)       Start:  10/26/23    Expected End:  01/18/24       Pain to be less than or equal to 2/10 with greater than or equal to 45 minutes activity.          OT Goal 4 (Progressing)       Start:  10/26/23    Expected End:  01/18/24       Patient will demonstrate a progressive increase in ROM as  appropriate with bilateral  to be greater than or equal to 30# to help patient improve efficiency of ADL/IADL function.            OT Goal 5 (Progressing)       Start:  10/26/23    Expected End:  01/18/24       Patient will demonstrate a progressive increase in wrist ROM to help improve efficiency with ADL/IADL function, as evidenced by improved QuickDash score of less than 20%             The supervising therapist was present for the entire session and made all clinical decisions.

## 2023-12-14 ENCOUNTER — TREATMENT (OUTPATIENT)
Dept: OCCUPATIONAL THERAPY | Facility: HOSPITAL | Age: 83
End: 2023-12-14
Payer: MEDICARE

## 2023-12-14 DIAGNOSIS — M79.641 PAIN IN RIGHT HAND: ICD-10-CM

## 2023-12-14 DIAGNOSIS — S62.101S RIGHT WRIST FRACTURE, SEQUELA: ICD-10-CM

## 2023-12-14 DIAGNOSIS — R29.898 WEAKNESS OF RIGHT HAND: ICD-10-CM

## 2023-12-14 PROCEDURE — 97110 THERAPEUTIC EXERCISES: CPT | Mod: GO | Performed by: OCCUPATIONAL THERAPIST

## 2023-12-14 PROCEDURE — 97530 THERAPEUTIC ACTIVITIES: CPT | Mod: GO | Performed by: OCCUPATIONAL THERAPIST

## 2023-12-14 ASSESSMENT — PAIN SCALES - GENERAL: PAINLEVEL_OUTOF10: 4

## 2023-12-14 ASSESSMENT — PAIN - FUNCTIONAL ASSESSMENT: PAIN_FUNCTIONAL_ASSESSMENT: 0-10

## 2023-12-21 ENCOUNTER — APPOINTMENT (OUTPATIENT)
Dept: OCCUPATIONAL THERAPY | Facility: HOSPITAL | Age: 83
End: 2023-12-21
Payer: MEDICARE

## 2023-12-28 ENCOUNTER — APPOINTMENT (OUTPATIENT)
Dept: OCCUPATIONAL THERAPY | Facility: HOSPITAL | Age: 83
End: 2023-12-28
Payer: MEDICARE

## 2024-01-04 ENCOUNTER — APPOINTMENT (OUTPATIENT)
Dept: OCCUPATIONAL THERAPY | Facility: HOSPITAL | Age: 84
End: 2024-01-04
Payer: MEDICARE

## 2024-01-11 ENCOUNTER — APPOINTMENT (OUTPATIENT)
Dept: OCCUPATIONAL THERAPY | Facility: HOSPITAL | Age: 84
End: 2024-01-11
Payer: MEDICARE